# Patient Record
Sex: FEMALE | Race: WHITE | NOT HISPANIC OR LATINO
[De-identification: names, ages, dates, MRNs, and addresses within clinical notes are randomized per-mention and may not be internally consistent; named-entity substitution may affect disease eponyms.]

---

## 2023-01-09 ENCOUNTER — LABORATORY RESULT (OUTPATIENT)
Age: 33
End: 2023-01-09

## 2023-01-09 PROBLEM — Z00.00 ENCOUNTER FOR PREVENTIVE HEALTH EXAMINATION: Status: ACTIVE | Noted: 2023-01-09

## 2023-01-10 ENCOUNTER — APPOINTMENT (OUTPATIENT)
Dept: OBGYN | Facility: CLINIC | Age: 33
End: 2023-01-10
Payer: MEDICAID

## 2023-01-10 ENCOUNTER — LABORATORY RESULT (OUTPATIENT)
Age: 33
End: 2023-01-10

## 2023-01-10 VITALS
WEIGHT: 208 LBS | SYSTOLIC BLOOD PRESSURE: 138 MMHG | HEIGHT: 62 IN | BODY MASS INDEX: 38.28 KG/M2 | DIASTOLIC BLOOD PRESSURE: 69 MMHG

## 2023-01-10 DIAGNOSIS — Z34.91 ENCOUNTER FOR SUPERVISION OF NORMAL PREGNANCY, UNSPECIFIED, FIRST TRIMESTER: ICD-10-CM

## 2023-01-10 DIAGNOSIS — O46.90 ANTEPARTUM HEMORRHAGE, UNSPECIFIED, UNSPECIFIED TRIMESTER: ICD-10-CM

## 2023-01-10 DIAGNOSIS — F41.9 ANXIETY DISORDER, UNSPECIFIED: ICD-10-CM

## 2023-01-10 LAB
BILIRUB UR QL STRIP: NORMAL
GLUCOSE UR-MCNC: NORMAL
HCG UR QL: 0.2 EU/DL
HGB UR QL STRIP.AUTO: NORMAL
KETONES UR-MCNC: NORMAL
LEUKOCYTE ESTERASE UR QL STRIP: NORMAL
NITRITE UR QL STRIP: NORMAL
PH UR STRIP: 8.5
PROT UR STRIP-MCNC: NORMAL
SP GR UR STRIP: 1.02

## 2023-01-10 PROCEDURE — 76817 TRANSVAGINAL US OBSTETRIC: CPT

## 2023-01-10 PROCEDURE — 99385 PREV VISIT NEW AGE 18-39: CPT

## 2023-01-10 PROCEDURE — 81003 URINALYSIS AUTO W/O SCOPE: CPT | Mod: QW

## 2023-01-10 PROCEDURE — 99202 OFFICE O/P NEW SF 15 MIN: CPT | Mod: 25

## 2023-01-10 RX ORDER — ALPRAZOLAM 0.25 MG/1
0.25 TABLET ORAL 3 TIMES DAILY
Qty: 20 | Refills: 0 | Status: ACTIVE | COMMUNITY
Start: 2023-01-10 | End: 1900-01-01

## 2023-01-10 NOTE — HISTORY OF PRESENT ILLNESS
[FreeTextEntry1] : PT COMES IN FOR ANNUAL EXAM AND EVALUATION OF VAGINAL BLEEDING IN PREGNANCY \par HX OF IRREG CYCLES HX OF PCOS\par   X 4 , HAD GDM WITH 3 OUT 4 PREGNANCIES , LARGEST 8-4 \par INTERCOURSE 2 DAYS AGO, THIS AM NOTICED LIGHT SPOTTING WHEN USING A BATHROOM \par VERY ANXIOUS ABOUT IT \par ON SYNTHROID 200 MCG, FORGOT TO TAE LAST FEW DAYS \par \par URINE DIP NEG FOR UTI \par OBESE

## 2023-01-10 NOTE — PROCEDURE
[Cervical Pap Smear] : cervical Pap smear [Liquid Base] : liquid base [Tolerated Well] : the patient tolerated the procedure well [No Complications] : there were no complications [Transvaginal OB Sonogram] : Transvaginal OB Sonogram [Intrauterine Pregnancy] : intrauterine pregnancy [Yolk Sac] : yolk sac present [Fetal Heart] : fetal heart present [Date: ___] : Date: [unfilled] [Current GA by Sonogram: ___ (wks)] : Current GA by Sonogram: [unfilled]Uwks [___ day(s)] : [unfilled] days [Transvaginal OB Sonogram WNL] : Transvaginal OB Sonogram WNL [FreeTextEntry1] : POS FH, POS FM, BY FRANCIA LITTLE PRESENT, EDC 08/31/2023

## 2023-01-10 NOTE — PHYSICAL EXAM
[Chaperone Present] : A chaperone was present in the examining room during all aspects of the physical examination [FreeTextEntry1] : ARGELIA [Appropriately responsive] : appropriately responsive [Alert] : alert [No Acute Distress] : no acute distress [No Murmurs] : no murmurs [Soft] : soft [Non-tender] : non-tender [Non-distended] : non-distended [No HSM] : No HSM [No Lesions] : no lesions [No Mass] : no mass [Oriented x3] : oriented x3 [Examination Of The Breasts] : a normal appearance [No Masses] : no breast masses were palpable [Labia Majora] : normal [Labia Minora] : normal [Normal] : normal [Uterine Adnexae] : normal

## 2023-01-10 NOTE — PLAN
[FreeTextEntry1] : ANNUAL EXAM \par PAP GC CHL SENT FROM THE OFFICE\par NORMAL VIABLE PREGNANCY C/W DATES\par PT REASSURED\par  WILL SEND PN LABS, HGBA1C, TFT'S \par RX FOR XANAX SENT PRN\par DECLINES GENETICS\par  DECLINES FLU VACCINE \par N/V 4 WEEK

## 2023-01-11 ENCOUNTER — APPOINTMENT (OUTPATIENT)
Dept: OBGYN | Facility: CLINIC | Age: 33
End: 2023-01-11

## 2023-01-11 LAB
ABO + RH PNL BLD: NORMAL
BASOPHILS # BLD AUTO: 0.01 K/UL
BASOPHILS NFR BLD AUTO: 0.1 %
BLD GP AB SCN SERPL QL: NORMAL
EOSINOPHIL # BLD AUTO: 0.02 K/UL
EOSINOPHIL NFR BLD AUTO: 0.3 %
ESTIMATED AVERAGE GLUCOSE: 100 MG/DL
HBA1C MFR BLD HPLC: 5.1 %
HCT VFR BLD CALC: 40.9 %
HGB BLD-MCNC: 14 G/DL
HIV1+2 AB SPEC QL IA.RAPID: NONREACTIVE
IMM GRANULOCYTES NFR BLD AUTO: 0.4 %
LYMPHOCYTES # BLD AUTO: 1.62 K/UL
LYMPHOCYTES NFR BLD AUTO: 20.9 %
MAN DIFF?: NORMAL
MCHC RBC-ENTMCNC: 29.4 PG
MCHC RBC-ENTMCNC: 34.2 G/DL
MCV RBC AUTO: 85.9 FL
MONOCYTES # BLD AUTO: 0.43 K/UL
MONOCYTES NFR BLD AUTO: 5.5 %
NEUTROPHILS # BLD AUTO: 5.64 K/UL
NEUTROPHILS NFR BLD AUTO: 72.8 %
PLATELET # BLD AUTO: 292 K/UL
RBC # BLD: 4.76 M/UL
RBC # FLD: 13.7 %
TSH SERPL-ACNC: 0.01 UIU/ML
WBC # FLD AUTO: 7.75 K/UL

## 2023-01-18 LAB
B19V IGG SER QL IA: 7.04 INDEX
B19V IGG+IGM SER-IMP: NORMAL
B19V IGG+IGM SER-IMP: POSITIVE
B19V IGM FLD-ACNC: 0.12 INDEX
B19V IGM SER-ACNC: NEGATIVE
BACTERIA UR CULT: ABNORMAL
CYTOLOGY CVX/VAG DOC THIN PREP: NORMAL
HBV SURFACE AG SER QL: NONREACTIVE
LEAD BLD-MCNC: <1 UG/DL
MEV IGG FLD QL IA: 19.1 AU/ML
MEV IGG+IGM SER-IMP: POSITIVE
MUV AB SER-ACNC: POSITIVE
MUV IGG SER QL IA: 214 AU/ML
RUBV IGG FLD-ACNC: 1.5 INDEX
RUBV IGG SER-IMP: POSITIVE
T PALLIDUM AB SER QL IA: NEGATIVE
VZV AB TITR SER: POSITIVE
VZV IGG SER IF-ACNC: 1400 INDEX

## 2023-01-20 RX ORDER — PROGESTERONE 100 MG/1
100 CAPSULE ORAL
Qty: 60 | Refills: 0 | Status: ACTIVE | COMMUNITY
Start: 2023-01-20 | End: 1900-01-01

## 2023-01-23 ENCOUNTER — NON-APPOINTMENT (OUTPATIENT)
Age: 33
End: 2023-01-23

## 2023-01-23 ENCOUNTER — APPOINTMENT (OUTPATIENT)
Dept: OBGYN | Facility: CLINIC | Age: 33
End: 2023-01-23
Payer: MEDICAID

## 2023-01-23 VITALS
SYSTOLIC BLOOD PRESSURE: 111 MMHG | DIASTOLIC BLOOD PRESSURE: 75 MMHG | HEIGHT: 62 IN | WEIGHT: 210 LBS | BODY MASS INDEX: 38.64 KG/M2

## 2023-01-23 DIAGNOSIS — O99.810 ABNORMAL GLUCOSE COMPLICATING PREGNANCY: ICD-10-CM

## 2023-01-23 DIAGNOSIS — E03.9 HYPOTHYROIDISM, UNSPECIFIED: ICD-10-CM

## 2023-01-23 LAB
BILIRUB UR QL STRIP: NORMAL
GLUCOSE UR-MCNC: NORMAL
HCG UR QL: 0.2 EU/DL
HGB UR QL STRIP.AUTO: NORMAL
KETONES UR-MCNC: NORMAL
LEUKOCYTE ESTERASE UR QL STRIP: NORMAL
NITRITE UR QL STRIP: NORMAL
PH UR STRIP: 7
PROT UR STRIP-MCNC: 30
SP GR UR STRIP: 1.03

## 2023-01-23 PROCEDURE — 81003 URINALYSIS AUTO W/O SCOPE: CPT | Mod: QW

## 2023-01-23 PROCEDURE — 76817 TRANSVAGINAL US OBSTETRIC: CPT

## 2023-01-23 PROCEDURE — 0501F PRENATAL FLOW SHEET: CPT | Mod: NC

## 2023-01-23 RX ORDER — ISOPROPYL ALCOHOL 70 ML/100ML
SWAB TOPICAL
Qty: 200 | Refills: 5 | Status: ACTIVE | COMMUNITY
Start: 2023-01-23 | End: 1900-01-01

## 2023-01-23 RX ORDER — BLOOD SUGAR DIAGNOSTIC
STRIP MISCELLANEOUS 4 TIMES DAILY
Qty: 100 | Refills: 5 | Status: ACTIVE | COMMUNITY
Start: 2023-01-23 | End: 1900-01-01

## 2023-01-23 RX ORDER — LEVOTHYROXINE SODIUM 0.15 MG/1
150 TABLET ORAL DAILY
Qty: 30 | Refills: 2 | Status: ACTIVE | COMMUNITY
Start: 2023-01-23 | End: 1900-01-01

## 2023-01-23 RX ORDER — LANCETS 28 GAUGE
EACH MISCELLANEOUS
Qty: 100 | Refills: 5 | Status: ACTIVE | COMMUNITY
Start: 2023-01-23 | End: 1900-01-01

## 2023-01-23 RX ORDER — BLOOD-GLUCOSE METER
W/DEVICE KIT MISCELLANEOUS
Qty: 1 | Refills: 0 | Status: ACTIVE | COMMUNITY
Start: 2023-01-23 | End: 1900-01-01

## 2023-01-24 LAB
FOLATE SERPL-MCNC: >20 NG/ML
VIT B12 SERPL-MCNC: 627 PG/ML

## 2023-01-26 ENCOUNTER — NON-APPOINTMENT (OUTPATIENT)
Age: 33
End: 2023-01-26

## 2023-01-26 LAB
BACTERIA UR CULT: NORMAL
T4 FREE SERPL-MCNC: 2.1 NG/DL
TSH SERPL-ACNC: <0 UIU/ML

## 2023-02-09 ENCOUNTER — APPOINTMENT (OUTPATIENT)
Dept: OBGYN | Facility: CLINIC | Age: 33
End: 2023-02-09
Payer: MEDICAID

## 2023-02-09 VITALS
DIASTOLIC BLOOD PRESSURE: 83 MMHG | BODY MASS INDEX: 38.64 KG/M2 | SYSTOLIC BLOOD PRESSURE: 134 MMHG | HEIGHT: 62 IN | WEIGHT: 210 LBS

## 2023-02-09 LAB
BILIRUB UR QL STRIP: NORMAL
GLUCOSE UR-MCNC: NORMAL
HCG UR QL: 0.2 EU/DL
HGB UR QL STRIP.AUTO: NORMAL
KETONES UR-MCNC: NORMAL
LEUKOCYTE ESTERASE UR QL STRIP: NORMAL
NITRITE UR QL STRIP: NORMAL
PH UR STRIP: 8.5
PROT UR STRIP-MCNC: NORMAL
SP GR UR STRIP: 1.01

## 2023-02-09 PROCEDURE — 76817 TRANSVAGINAL US OBSTETRIC: CPT

## 2023-02-09 PROCEDURE — 81003 URINALYSIS AUTO W/O SCOPE: CPT | Mod: QW

## 2023-02-09 PROCEDURE — 0502F SUBSEQUENT PRENATAL CARE: CPT | Mod: NC

## 2023-03-08 ENCOUNTER — NON-APPOINTMENT (OUTPATIENT)
Age: 33
End: 2023-03-08

## 2023-03-10 ENCOUNTER — APPOINTMENT (OUTPATIENT)
Dept: OBGYN | Facility: CLINIC | Age: 33
End: 2023-03-10
Payer: MEDICAID

## 2023-03-10 VITALS — SYSTOLIC BLOOD PRESSURE: 119 MMHG | BODY MASS INDEX: 39.14 KG/M2 | DIASTOLIC BLOOD PRESSURE: 80 MMHG | WEIGHT: 214 LBS

## 2023-03-10 DIAGNOSIS — Z34.90 ENCOUNTER FOR SUPERVISION OF NORMAL PREGNANCY, UNSPECIFIED, UNSPECIFIED TRIMESTER: ICD-10-CM

## 2023-03-10 DIAGNOSIS — Z3A.01 LESS THAN 8 WEEKS GESTATION OF PREGNANCY: ICD-10-CM

## 2023-03-10 LAB
BILIRUB UR QL STRIP: NORMAL
GLUCOSE UR-MCNC: NORMAL
HCG UR QL: 0.2 EU/DL
HGB UR QL STRIP.AUTO: NORMAL
KETONES UR-MCNC: NORMAL
LEUKOCYTE ESTERASE UR QL STRIP: NORMAL
NITRITE UR QL STRIP: NORMAL
PH UR STRIP: 7
PROT UR STRIP-MCNC: NORMAL
SP GR UR STRIP: 1.02

## 2023-03-10 PROCEDURE — 81003 URINALYSIS AUTO W/O SCOPE: CPT | Mod: QW

## 2023-03-10 PROCEDURE — 0502F SUBSEQUENT PRENATAL CARE: CPT | Mod: NC

## 2023-03-22 ENCOUNTER — APPOINTMENT (OUTPATIENT)
Dept: MATERNAL FETAL MEDICINE | Facility: CLINIC | Age: 33
End: 2023-03-22

## 2023-03-22 ENCOUNTER — APPOINTMENT (OUTPATIENT)
Dept: MATERNAL FETAL MEDICINE | Facility: CLINIC | Age: 33
End: 2023-03-22
Payer: MEDICAID

## 2023-03-22 ENCOUNTER — ASOB RESULT (OUTPATIENT)
Age: 33
End: 2023-03-22

## 2023-03-22 PROCEDURE — G0108 DIAB MANAGE TRN  PER INDIV: CPT | Mod: 95

## 2023-04-04 ENCOUNTER — ASOB RESULT (OUTPATIENT)
Age: 33
End: 2023-04-04

## 2023-04-04 ENCOUNTER — APPOINTMENT (OUTPATIENT)
Dept: MATERNAL FETAL MEDICINE | Facility: CLINIC | Age: 33
End: 2023-04-04
Payer: MEDICAID

## 2023-04-04 PROCEDURE — G0108 DIAB MANAGE TRN  PER INDIV: CPT | Mod: 95

## 2023-04-16 ENCOUNTER — NON-APPOINTMENT (OUTPATIENT)
Age: 33
End: 2023-04-16

## 2023-04-17 ENCOUNTER — TRANSCRIPTION ENCOUNTER (OUTPATIENT)
Age: 33
End: 2023-04-17

## 2023-04-17 ENCOUNTER — APPOINTMENT (OUTPATIENT)
Dept: ANTEPARTUM | Facility: CLINIC | Age: 33
End: 2023-04-17
Payer: MEDICAID

## 2023-04-17 ENCOUNTER — ASOB RESULT (OUTPATIENT)
Age: 33
End: 2023-04-17

## 2023-04-17 PROCEDURE — 76811 OB US DETAILED SNGL FETUS: CPT

## 2023-04-19 ENCOUNTER — ASOB RESULT (OUTPATIENT)
Age: 33
End: 2023-04-19

## 2023-04-19 ENCOUNTER — APPOINTMENT (OUTPATIENT)
Dept: MATERNAL FETAL MEDICINE | Facility: CLINIC | Age: 33
End: 2023-04-19
Payer: MEDICAID

## 2023-04-19 PROCEDURE — G0108 DIAB MANAGE TRN  PER INDIV: CPT | Mod: 95

## 2023-05-01 ENCOUNTER — APPOINTMENT (OUTPATIENT)
Dept: OBGYN | Facility: CLINIC | Age: 33
End: 2023-05-01
Payer: MEDICAID

## 2023-05-01 VITALS — BODY MASS INDEX: 38.96 KG/M2 | WEIGHT: 213 LBS | DIASTOLIC BLOOD PRESSURE: 83 MMHG | SYSTOLIC BLOOD PRESSURE: 126 MMHG

## 2023-05-01 PROCEDURE — 0502F SUBSEQUENT PRENATAL CARE: CPT | Mod: NC

## 2023-05-01 PROCEDURE — 81003 URINALYSIS AUTO W/O SCOPE: CPT | Mod: QW

## 2023-05-10 ENCOUNTER — APPOINTMENT (OUTPATIENT)
Dept: MATERNAL FETAL MEDICINE | Facility: CLINIC | Age: 33
End: 2023-05-10
Payer: MEDICAID

## 2023-05-10 ENCOUNTER — ASOB RESULT (OUTPATIENT)
Age: 33
End: 2023-05-10

## 2023-05-10 PROCEDURE — G0108 DIAB MANAGE TRN  PER INDIV: CPT | Mod: 95

## 2023-05-16 ENCOUNTER — NON-APPOINTMENT (OUTPATIENT)
Age: 33
End: 2023-05-16

## 2023-06-01 ENCOUNTER — ASOB RESULT (OUTPATIENT)
Age: 33
End: 2023-06-01

## 2023-06-01 ENCOUNTER — APPOINTMENT (OUTPATIENT)
Dept: MATERNAL FETAL MEDICINE | Facility: CLINIC | Age: 33
End: 2023-06-01
Payer: MEDICAID

## 2023-06-01 PROCEDURE — G0108 DIAB MANAGE TRN  PER INDIV: CPT | Mod: 95

## 2023-06-01 RX ORDER — SYRINGE-NEEDLE,INSULIN,0.5 ML 31 GX5/16"
31G X 5/16" SYRINGE, EMPTY DISPOSABLE MISCELLANEOUS
Qty: 1 | Refills: 2 | Status: ACTIVE | COMMUNITY
Start: 2023-06-01 | End: 1900-01-01

## 2023-06-01 RX ORDER — INSULIN LISPRO 100 [IU]/ML
100 INJECTION, SOLUTION INTRAVENOUS; SUBCUTANEOUS
Qty: 1 | Refills: 2 | Status: ACTIVE | COMMUNITY
Start: 2023-06-01 | End: 1900-01-01

## 2023-06-02 ENCOUNTER — APPOINTMENT (OUTPATIENT)
Dept: OBGYN | Facility: CLINIC | Age: 33
End: 2023-06-02
Payer: MEDICAID

## 2023-06-02 VITALS — BODY MASS INDEX: 38.78 KG/M2 | SYSTOLIC BLOOD PRESSURE: 117 MMHG | DIASTOLIC BLOOD PRESSURE: 80 MMHG | WEIGHT: 212 LBS

## 2023-06-02 LAB
BILIRUB UR QL STRIP: NORMAL
GLUCOSE UR-MCNC: NORMAL
HCG UR QL: 2 EU/DL
HGB UR QL STRIP.AUTO: NORMAL
KETONES UR-MCNC: NORMAL
LEUKOCYTE ESTERASE UR QL STRIP: NORMAL
NITRITE UR QL STRIP: NORMAL
PH UR STRIP: 7.5
PROT UR STRIP-MCNC: 30
SP GR UR STRIP: 1.01

## 2023-06-02 PROCEDURE — 76816 OB US FOLLOW-UP PER FETUS: CPT

## 2023-06-02 PROCEDURE — 0502F SUBSEQUENT PRENATAL CARE: CPT

## 2023-06-10 ENCOUNTER — NON-APPOINTMENT (OUTPATIENT)
Age: 33
End: 2023-06-10

## 2023-06-22 ENCOUNTER — ASOB RESULT (OUTPATIENT)
Age: 33
End: 2023-06-22

## 2023-06-22 ENCOUNTER — APPOINTMENT (OUTPATIENT)
Dept: MATERNAL FETAL MEDICINE | Facility: CLINIC | Age: 33
End: 2023-06-22
Payer: MEDICAID

## 2023-06-22 PROCEDURE — G0108 DIAB MANAGE TRN  PER INDIV: CPT | Mod: 95

## 2023-06-27 ENCOUNTER — APPOINTMENT (OUTPATIENT)
Dept: OBGYN | Facility: CLINIC | Age: 33
End: 2023-06-27
Payer: MEDICAID

## 2023-06-27 VITALS — BODY MASS INDEX: 38.96 KG/M2 | SYSTOLIC BLOOD PRESSURE: 116 MMHG | DIASTOLIC BLOOD PRESSURE: 75 MMHG | WEIGHT: 213 LBS

## 2023-06-27 LAB
BILIRUB UR QL STRIP: NORMAL
GLUCOSE UR-MCNC: NORMAL
HCG UR QL: 0.2 EU/DL
HGB UR QL STRIP.AUTO: NORMAL
KETONES UR-MCNC: NORMAL
NITRITE UR QL STRIP: NORMAL
PH UR STRIP: 8.5
PROT UR STRIP-MCNC: NORMAL
SP GR UR STRIP: 1.01

## 2023-06-27 PROCEDURE — 0502F SUBSEQUENT PRENATAL CARE: CPT

## 2023-06-27 PROCEDURE — 76819 FETAL BIOPHYS PROFIL W/O NST: CPT

## 2023-07-13 ENCOUNTER — APPOINTMENT (OUTPATIENT)
Dept: MATERNAL FETAL MEDICINE | Facility: CLINIC | Age: 33
End: 2023-07-13
Payer: COMMERCIAL

## 2023-07-13 ENCOUNTER — ASOB RESULT (OUTPATIENT)
Age: 33
End: 2023-07-13

## 2023-07-13 PROCEDURE — G0108 DIAB MANAGE TRN  PER INDIV: CPT | Mod: 95

## 2023-07-21 ENCOUNTER — APPOINTMENT (OUTPATIENT)
Dept: OBGYN | Facility: CLINIC | Age: 33
End: 2023-07-21
Payer: COMMERCIAL

## 2023-07-21 VITALS — SYSTOLIC BLOOD PRESSURE: 124 MMHG | BODY MASS INDEX: 39.69 KG/M2 | WEIGHT: 217 LBS | DIASTOLIC BLOOD PRESSURE: 80 MMHG

## 2023-07-21 LAB
BILIRUB UR QL STRIP: NORMAL
GLUCOSE UR-MCNC: NORMAL
HCG UR QL: 0.2 EU/DL
HGB UR QL STRIP.AUTO: NORMAL
KETONES UR-MCNC: NORMAL
LEUKOCYTE ESTERASE UR QL STRIP: NORMAL
NITRITE UR QL STRIP: NORMAL
PH UR STRIP: 6
PROT UR STRIP-MCNC: 30
SP GR UR STRIP: 1.02

## 2023-07-21 PROCEDURE — 59025 FETAL NON-STRESS TEST: CPT

## 2023-07-21 PROCEDURE — 81003 URINALYSIS AUTO W/O SCOPE: CPT | Mod: QW

## 2023-07-21 PROCEDURE — 99213 OFFICE O/P EST LOW 20 MIN: CPT | Mod: 25

## 2023-07-21 PROCEDURE — 76816 OB US FOLLOW-UP PER FETUS: CPT

## 2023-07-21 RX ORDER — INSULIN HUMAN 100 [IU]/ML
100 INJECTION, SUSPENSION SUBCUTANEOUS AT BEDTIME
Qty: 1 | Refills: 1 | Status: ACTIVE | COMMUNITY
Start: 2023-07-21 | End: 1900-01-01

## 2023-07-28 ENCOUNTER — APPOINTMENT (OUTPATIENT)
Dept: OBGYN | Facility: CLINIC | Age: 33
End: 2023-07-28
Payer: COMMERCIAL

## 2023-07-28 VITALS — SYSTOLIC BLOOD PRESSURE: 109 MMHG | DIASTOLIC BLOOD PRESSURE: 74 MMHG | BODY MASS INDEX: 39.51 KG/M2 | WEIGHT: 216 LBS

## 2023-07-28 LAB
BILIRUB UR QL STRIP: NORMAL
GLUCOSE UR-MCNC: NORMAL
HCG UR QL: 1 EU/DL
HGB UR QL STRIP.AUTO: NORMAL
KETONES UR-MCNC: NORMAL
LEUKOCYTE ESTERASE UR QL STRIP: NORMAL
NITRITE UR QL STRIP: NORMAL
PH UR STRIP: 6.5
PROT UR STRIP-MCNC: 30
SP GR UR STRIP: 1.02

## 2023-07-28 PROCEDURE — 81003 URINALYSIS AUTO W/O SCOPE: CPT | Mod: QW

## 2023-07-28 PROCEDURE — 76818 FETAL BIOPHYS PROFILE W/NST: CPT

## 2023-07-28 PROCEDURE — 99213 OFFICE O/P EST LOW 20 MIN: CPT | Mod: 25

## 2023-07-31 ENCOUNTER — APPOINTMENT (OUTPATIENT)
Dept: ANTEPARTUM | Facility: CLINIC | Age: 33
End: 2023-07-31
Payer: COMMERCIAL

## 2023-07-31 ENCOUNTER — ASOB RESULT (OUTPATIENT)
Age: 33
End: 2023-07-31

## 2023-07-31 VITALS
HEART RATE: 89 BPM | WEIGHT: 219 LBS | DIASTOLIC BLOOD PRESSURE: 80 MMHG | BODY MASS INDEX: 40.3 KG/M2 | HEIGHT: 62 IN | SYSTOLIC BLOOD PRESSURE: 112 MMHG

## 2023-07-31 PROCEDURE — 76816 OB US FOLLOW-UP PER FETUS: CPT

## 2023-07-31 PROCEDURE — 76819 FETAL BIOPHYS PROFIL W/O NST: CPT | Mod: 59

## 2023-08-01 ENCOUNTER — ASOB RESULT (OUTPATIENT)
Age: 33
End: 2023-08-01

## 2023-08-01 ENCOUNTER — APPOINTMENT (OUTPATIENT)
Dept: MATERNAL FETAL MEDICINE | Facility: CLINIC | Age: 33
End: 2023-08-01
Payer: COMMERCIAL

## 2023-08-01 LAB
ESTIMATED AVERAGE GLUCOSE: 108 MG/DL
HBA1C MFR BLD HPLC: 5.4 %
HCV AB SER QL: NONREACTIVE
HCV S/CO RATIO: 0.07 S/CO
HIV1+2 AB SPEC QL IA.RAPID: NONREACTIVE
RPR SER-TITR: NORMAL

## 2023-08-01 PROCEDURE — G0108 DIAB MANAGE TRN  PER INDIV: CPT | Mod: 95

## 2023-08-04 ENCOUNTER — APPOINTMENT (OUTPATIENT)
Dept: OBGYN | Facility: CLINIC | Age: 33
End: 2023-08-04
Payer: COMMERCIAL

## 2023-08-04 VITALS — SYSTOLIC BLOOD PRESSURE: 122 MMHG | WEIGHT: 218 LBS | BODY MASS INDEX: 39.87 KG/M2 | DIASTOLIC BLOOD PRESSURE: 81 MMHG

## 2023-08-04 PROCEDURE — 76818 FETAL BIOPHYS PROFILE W/NST: CPT

## 2023-08-04 PROCEDURE — 81003 URINALYSIS AUTO W/O SCOPE: CPT | Mod: QW

## 2023-08-04 PROCEDURE — 99213 OFFICE O/P EST LOW 20 MIN: CPT | Mod: 25

## 2023-08-07 ENCOUNTER — APPOINTMENT (OUTPATIENT)
Dept: OBGYN | Facility: CLINIC | Age: 33
End: 2023-08-07
Payer: COMMERCIAL

## 2023-08-07 ENCOUNTER — NON-APPOINTMENT (OUTPATIENT)
Age: 33
End: 2023-08-07

## 2023-08-07 ENCOUNTER — INPATIENT (INPATIENT)
Facility: HOSPITAL | Age: 33
LOS: 2 days | Discharge: HOME | DRG: 540 | End: 2023-08-10
Attending: SPECIALIST | Admitting: SPECIALIST
Payer: COMMERCIAL

## 2023-08-07 VITALS — DIASTOLIC BLOOD PRESSURE: 72 MMHG | WEIGHT: 218 LBS | SYSTOLIC BLOOD PRESSURE: 109 MMHG | BODY MASS INDEX: 39.87 KG/M2

## 2023-08-07 VITALS
HEIGHT: 62 IN | RESPIRATION RATE: 14 BRPM | WEIGHT: 218.04 LBS | HEART RATE: 100 BPM | SYSTOLIC BLOOD PRESSURE: 130 MMHG | TEMPERATURE: 98 F | DIASTOLIC BLOOD PRESSURE: 78 MMHG

## 2023-08-07 DIAGNOSIS — O26.893 OTHER SPECIFIED PREGNANCY RELATED CONDITIONS, THIRD TRIMESTER: ICD-10-CM

## 2023-08-07 DIAGNOSIS — Z98.84 BARIATRIC SURGERY STATUS: Chronic | ICD-10-CM

## 2023-08-07 DIAGNOSIS — E11.9 TYPE 2 DIABETES MELLITUS W/OUT COMPLICATIONS: ICD-10-CM

## 2023-08-07 DIAGNOSIS — Z34.90 ENCOUNTER FOR SUPERVISION OF NORMAL PREGNANCY, UNSPECIFIED, UNSPECIFIED TRIMESTER: ICD-10-CM

## 2023-08-07 LAB
APPEARANCE UR: ABNORMAL
BASOPHILS # BLD AUTO: 0.02 K/UL — SIGNIFICANT CHANGE UP (ref 0–0.2)
BASOPHILS NFR BLD AUTO: 0.2 % — SIGNIFICANT CHANGE UP (ref 0–1)
BILIRUB UR QL STRIP: NORMAL
BILIRUB UR-MCNC: NEGATIVE — SIGNIFICANT CHANGE UP
COLOR SPEC: YELLOW — SIGNIFICANT CHANGE UP
DIFF PNL FLD: NEGATIVE — SIGNIFICANT CHANGE UP
EOSINOPHIL # BLD AUTO: 0.03 K/UL — SIGNIFICANT CHANGE UP (ref 0–0.7)
EOSINOPHIL NFR BLD AUTO: 0.3 % — SIGNIFICANT CHANGE UP (ref 0–8)
GLUCOSE BLDC GLUCOMTR-MCNC: 85 MG/DL — SIGNIFICANT CHANGE UP (ref 70–99)
GLUCOSE UR QL: NEGATIVE MG/DL — SIGNIFICANT CHANGE UP
GLUCOSE UR-MCNC: NORMAL
HCG UR QL: 0.2 EU/DL
HCT VFR BLD CALC: 40 % — SIGNIFICANT CHANGE UP (ref 37–47)
HGB BLD-MCNC: 14 G/DL — SIGNIFICANT CHANGE UP (ref 12–16)
HGB UR QL STRIP.AUTO: NORMAL
IMM GRANULOCYTES NFR BLD AUTO: 0.5 % — HIGH (ref 0.1–0.3)
KETONES UR-MCNC: NEGATIVE MG/DL — SIGNIFICANT CHANGE UP
KETONES UR-MCNC: NORMAL
LEUKOCYTE ESTERASE UR QL STRIP: NORMAL
LEUKOCYTE ESTERASE UR-ACNC: ABNORMAL
LYMPHOCYTES # BLD AUTO: 1.95 K/UL — SIGNIFICANT CHANGE UP (ref 1.2–3.4)
LYMPHOCYTES # BLD AUTO: 20 % — LOW (ref 20.5–51.1)
MCHC RBC-ENTMCNC: 30.2 PG — SIGNIFICANT CHANGE UP (ref 27–31)
MCHC RBC-ENTMCNC: 35 G/DL — SIGNIFICANT CHANGE UP (ref 32–37)
MCV RBC AUTO: 86.2 FL — SIGNIFICANT CHANGE UP (ref 81–99)
MONOCYTES # BLD AUTO: 0.64 K/UL — HIGH (ref 0.1–0.6)
MONOCYTES NFR BLD AUTO: 6.6 % — SIGNIFICANT CHANGE UP (ref 1.7–9.3)
NEUTROPHILS # BLD AUTO: 7.06 K/UL — HIGH (ref 1.4–6.5)
NEUTROPHILS NFR BLD AUTO: 72.4 % — SIGNIFICANT CHANGE UP (ref 42.2–75.2)
NITRITE UR QL STRIP: NORMAL
NITRITE UR-MCNC: NEGATIVE — SIGNIFICANT CHANGE UP
NRBC # BLD: 0 /100 WBCS — SIGNIFICANT CHANGE UP (ref 0–0)
PH UR STRIP: 5.5
PH UR: 6.5 — SIGNIFICANT CHANGE UP (ref 5–8)
PLATELET # BLD AUTO: 191 K/UL — SIGNIFICANT CHANGE UP (ref 130–400)
PMV BLD: 10.6 FL — HIGH (ref 7.4–10.4)
PRENATAL SYPHILIS TEST: SIGNIFICANT CHANGE UP
PROT UR STRIP-MCNC: NORMAL
PROT UR-MCNC: SIGNIFICANT CHANGE UP MG/DL
RBC # BLD: 4.64 M/UL — SIGNIFICANT CHANGE UP (ref 4.2–5.4)
RBC # FLD: 13.3 % — SIGNIFICANT CHANGE UP (ref 11.5–14.5)
SP GR SPEC: 1.01 — SIGNIFICANT CHANGE UP (ref 1–1.03)
SP GR UR STRIP: 1.02
UROBILINOGEN FLD QL: 0.2 MG/DL — SIGNIFICANT CHANGE UP (ref 0.2–1)
WBC # BLD: 9.75 K/UL — SIGNIFICANT CHANGE UP (ref 4.8–10.8)
WBC # FLD AUTO: 9.75 K/UL — SIGNIFICANT CHANGE UP (ref 4.8–10.8)

## 2023-08-07 PROCEDURE — 86900 BLOOD TYPING SEROLOGIC ABO: CPT

## 2023-08-07 PROCEDURE — 82962 GLUCOSE BLOOD TEST: CPT

## 2023-08-07 PROCEDURE — 80307 DRUG TEST PRSMV CHEM ANLYZR: CPT

## 2023-08-07 PROCEDURE — 59025 FETAL NON-STRESS TEST: CPT | Mod: 59

## 2023-08-07 PROCEDURE — 85025 COMPLETE CBC W/AUTO DIFF WBC: CPT

## 2023-08-07 PROCEDURE — 76815 OB US LIMITED FETUS(S): CPT

## 2023-08-07 PROCEDURE — 81003 URINALYSIS AUTO W/O SCOPE: CPT | Mod: QW

## 2023-08-07 PROCEDURE — 80354 DRUG SCREENING FENTANYL: CPT

## 2023-08-07 PROCEDURE — 81001 URINALYSIS AUTO W/SCOPE: CPT

## 2023-08-07 PROCEDURE — 86850 RBC ANTIBODY SCREEN: CPT

## 2023-08-07 PROCEDURE — 59025 FETAL NON-STRESS TEST: CPT

## 2023-08-07 PROCEDURE — 86901 BLOOD TYPING SEROLOGIC RH(D): CPT

## 2023-08-07 PROCEDURE — 36415 COLL VENOUS BLD VENIPUNCTURE: CPT

## 2023-08-07 PROCEDURE — 86592 SYPHILIS TEST NON-TREP QUAL: CPT

## 2023-08-07 PROCEDURE — 99213 OFFICE O/P EST LOW 20 MIN: CPT | Mod: 25

## 2023-08-07 RX ORDER — OXYTOCIN 10 UNIT/ML
333.33 VIAL (ML) INJECTION
Qty: 20 | Refills: 0 | Status: DISCONTINUED | OUTPATIENT
Start: 2023-08-07 | End: 2023-08-08

## 2023-08-07 RX ORDER — NALOXONE HYDROCHLORIDE 4 MG/.1ML
0.1 SPRAY NASAL
Refills: 0 | Status: DISCONTINUED | OUTPATIENT
Start: 2023-08-07 | End: 2023-08-08

## 2023-08-07 RX ORDER — ONDANSETRON 8 MG/1
4 TABLET, FILM COATED ORAL EVERY 6 HOURS
Refills: 0 | Status: DISCONTINUED | OUTPATIENT
Start: 2023-08-07 | End: 2023-08-08

## 2023-08-07 RX ORDER — DEXAMETHASONE 0.5 MG/5ML
4 ELIXIR ORAL EVERY 6 HOURS
Refills: 0 | Status: DISCONTINUED | OUTPATIENT
Start: 2023-08-07 | End: 2023-08-08

## 2023-08-07 RX ORDER — FENTANYL/BUPIVACAINE/NS/PF 2MCG/ML-.1
250 PLASTIC BAG, INJECTION (ML) INJECTION
Refills: 0 | Status: DISCONTINUED | OUTPATIENT
Start: 2023-08-07 | End: 2023-08-08

## 2023-08-07 RX ORDER — SODIUM CHLORIDE 9 MG/ML
1000 INJECTION, SOLUTION INTRAVENOUS
Refills: 0 | Status: DISCONTINUED | OUTPATIENT
Start: 2023-08-07 | End: 2023-08-08

## 2023-08-07 RX ORDER — LEVOTHYROXINE SODIUM 125 MCG
150 TABLET ORAL DAILY
Refills: 0 | Status: DISCONTINUED | OUTPATIENT
Start: 2023-08-07 | End: 2023-08-08

## 2023-08-07 NOTE — OB PROVIDER H&P - NS_OBGYNHISTORY_OBGYN_ALL_OB_FT
OB Hx:  P1) 7/10/2011, female, FT   P2) 2013, female, largest 3515grams  P3) 3/28/2018, male  P4) 2021, female    1 SAB, no d/c  Gyn Hx: no hx of fibroids, cysts, STDs or abnormal pap smears

## 2023-08-07 NOTE — OB PROVIDER H&P - NSHPPHYSICALEXAM_GEN_ALL_CORE
Physical Exam  Vital Signs Last 24 Hrs  T(F): 98.2 (07 Aug 2023 20:10), Max: 98.2 (07 Aug 2023 20:10)  HR: 92 (07 Aug 2023 21:09) (92 - 100)  BP: 117/69 (07 Aug 2023 21:09) (111/68 - 130/78)  RR: 14 (07 Aug 2023 20:10) (14 - 14)    Gen: NAD, AOx3  Abdomen: soft, gravid, nontender, no palpable contractions    EFM: 145 bpm/ moderate variability/ +accels  [place]: not frantz  SVE:     BSS: vertex Physical Exam  Vital Signs Last 24 Hrs  T(F): 98.2 (07 Aug 2023 20:10), Max: 98.2 (07 Aug 2023 20:10)  HR: 92 (07 Aug 2023 21:09) (92 - 100)  BP: 117/69 (07 Aug 2023 21:09) (111/68 - 130/78)  RR: 14 (07 Aug 2023 20:10) (14 - 14)    Gen: NAD, AOx3  Abdomen: soft, gravid, nontender, no palpable contractions    EFM: 145 bpm/ moderate variability/ +accels  [place]: not frantz  SVE: 1/0/-3, vertex by sono, intact    BSS: vertex

## 2023-08-07 NOTE — PROCEDURE NOTE - ADDITIONAL PROCEDURE DETAILS
Thorough discussion of patient's history, as indicated above.  Discussed risks of epidural, including PDPH, inadequate analgesia occasionally requiring epidural catheter replacement, bleeding, infection and spinal cord injury.  Patient expressed understanding of these risks, signed informed consent and wishes to proceed with epidural catheter insertion.  Lumbar epidural performed at L3-4. Standard ASA monitors including BP, pulse oximetry, FHR. Sterile gloves, mask, chlorhexidine prep. 1% lidocaine for local infiltration. 17g Touhy. RAFY to saline at 6cm.  Epidural catheter threaded easily to 11cm. Touhy needle removed. Catheter secured in place. Aspiration negative for blood/CSF. Test dose consisting of 3ml 1.5% lidocaine with epinephrine was negative. 7mL 0.0625% bupi with fentanyl given via epidural infusion pump after negative aspiration. Patient tolerated procedure, was hemodynamically stable throughout and did not complain of pain or paresthesias. T10 level bilaterally. Epidural infusion consisting of 250ml 0.0625% bupivacaine with fentanyl 2mcg/ml infusing at 10 mL/hour with patient controlled bolus of 5cc q 15 minutes as needed for pain.    Several minutes after loading dose, patient felt dizziness and nausea, SBP 90s, repositioned to [facility], IV fluids given, phenylephrine 100mcg, ephedrine 5mg given iv; complete resolution of symptoms <60 seconds with sbp>120. Thorough discussion of patient's history, as indicated above.  Discussed risks of epidural, including PDPH, inadequate analgesia occasionally requiring epidural catheter replacement, bleeding, infection and spinal cord injury.  Patient expressed understanding of these risks, signed informed consent and wishes to proceed with epidural catheter insertion.  Lumbar epidural performed at L3-4. Standard ASA monitors including BP, pulse oximetry, FHR. Sterile gloves, mask, chlorhexidine prep. 1% lidocaine for local infiltration. 17g Touhy. RAFY to saline at 6cm.  Epidural catheter threaded easily to 11cm. Touhy needle removed. Catheter secured in place. Aspiration negative for blood/CSF. Test dose consisting of 3ml 1.5% lidocaine with epinephrine was negative. 7mL 0.0625% bupi with fentanyl given via epidural infusion pump after negative aspiration. Patient tolerated procedure, was hemodynamically stable throughout and did not complain of pain or paresthesias. T10 level bilaterally. Epidural infusion consisting of 250ml 0.0625% bupivacaine with fentanyl 2mcg/ml infusing at 10 mL/hour with patient controlled bolus of 5cc q 15 minutes as needed for pain.    Several minutes after loading dose, patient felt dizziness and nausea, SBP 90s, repositioned to [facility], IV fluids given, phenylephrine 100mcg, ephedrine 5mg given iv; complete resolution of symptoms <60 seconds with sbp>120.    1615 Patient seen at bedside. Mild pain with contractions. Plan CD for breech. 5cc 2% Lidocaine administred. 1630 T10 3cc 2% lidocaine. Pain well controlled.  VSS 1642 Awaiting OR. Thorough discussion of patient's history, as indicated above.  Discussed risks of epidural, including PDPH, inadequate analgesia occasionally requiring epidural catheter replacement, bleeding, infection and spinal cord injury.  Patient expressed understanding of these risks, signed informed consent and wishes to proceed with epidural catheter insertion.  Lumbar epidural performed at L3-4. Standard ASA monitors including BP, pulse oximetry, FHR. Sterile gloves, mask, chlorhexidine prep. 1% lidocaine for local infiltration. 17g Touhy. RAFY to saline at 6cm.  Epidural catheter threaded easily to 11cm. Touhy needle removed. Catheter secured in place. Aspiration negative for blood/CSF. Test dose consisting of 3ml 1.5% lidocaine with epinephrine was negative. 7mL 0.0625% bupi with fentanyl given via epidural infusion pump after negative aspiration. Patient tolerated procedure, was hemodynamically stable throughout and did not complain of pain or paresthesias. T10 level bilaterally. Epidural infusion consisting of 250ml 0.0625% bupivacaine with fentanyl 2mcg/ml infusing at 10 mL/hour with patient controlled bolus of 5cc q 15 minutes as needed for pain.    Several minutes after loading dose, patient felt dizziness and nausea, SBP 90s, repositioned to [facility], IV fluids given, phenylephrine 100mcg, ephedrine 5mg given iv; complete resolution of symptoms <60 seconds with sbp>120.    1615 Patient seen at bedside. Mild pain with contractions. Plan CD for breech. 5cc 2% Lidocaine administred. 1630 T10 3cc 2% lidocaine. Pain well controlled.  VSS 1642 Awaiting OR.    In OR Epidural one sided  CSE Performed     Patient sitting. Lumbar epidural performed at L3-4. Standard ASA monitors including FHR. Sterile gloves, Chloro-prep. 1% lidocaine for local infiltration. 17g touhy. RAFY with air. 27g jody used to make a dural puncture with + CSF. .75% Bupivacaine 1cc administered through spinal needle. Jody needle removed and epidural catheter threaded easily. Touhy needle removed. Catheter secured in place at 13  cm. Negative aspiration. Test dose consisting of 3ml 1.5% lidocaine with epinephrine was negative.  Patient tolerated procedure and was hemodynamically stable throughout. T4 level bilaterally.

## 2023-08-07 NOTE — OB PROVIDER H&P - NSHPLABSRESULTS_GEN_ALL_CORE
8/6  GBS negative  7/28  HBA1C 5.4  RPR NR  HepC NR  HIV NR  1/10  HepBSag NR  MMR immune  antibody screen negative  blood type O positive  GC negative    [place]:   35w6d: AC 37%, EFW 2555g, BPP 8/8, MVP 4.32, posterior placenta  20w6d: BRANDIE wnl, EFW 395g, anatomy nl, cervix 4.03cm, ovaries b/l nl

## 2023-08-07 NOTE — PROGRESS NOTE ADULT - SUBJECTIVE AND OBJECTIVE BOX
PGY1 Note    Patient seen at bedside for labor progression. No complaints at the moment.    T(F): 98.2 ( @ 20:10), Max: 98.2 ( @ 20:10)  HR: 85 ( @ 23:05)  BP: 106/53 ( @ 23:05) (92/47 - 130/78)  RR: 14 ( @ 21:34)  EFM: 135bpm/ moderate variability +accels   TOCO: not frantz   SVE: 1/0/-3    Medications:      Labs:                        14.0   9.75  )-----------( 191      ( 07 Aug 2023 20:25 )             40.0           Prenatal Syphilis Test: Nonreact ( @ 20:25)  Antibody Screen: NEG (23 @ 20:25)    Urinalysis Basic - ( 07 Aug 2023 20:25 )    Color: Yellow / Appearance: Cloudy / S.010 / pH: x  Gluc: x / Ketone: Negative mg/dL  / Bili: Negative / Urobili: 0.2 mg/dL   Blood: x / Protein: Trace mg/dL / Nitrite: Negative   Leuk Esterase: Large / RBC: 0 /HPF /  /HPF   Sq Epi: x / Non Sq Epi: 12 /HPF / Bacteria: Moderate /HPF

## 2023-08-07 NOTE — OB RN PATIENT PROFILE - NS_GBS_INFANT_INVASIVE_OBGYN_ALL_OB_FT
Problem: Nutritional:  Goal: Achieve adequate nutritional intake  Description: Patient will consume 50% of meals/supplements  Outcome: PROGRESSING AS EXPECTED     See RD note   Patient states no history

## 2023-08-07 NOTE — OB RN PATIENT PROFILE - FALL HARM RISK - UNIVERSAL INTERVENTIONS
Bed in lowest position, wheels locked, appropriate side rails in place/Call bell, personal items and telephone in reach/Instruct patient to call for assistance before getting out of bed or chair/Non-slip footwear when patient is out of bed/[place] to call system/Physically safe environment - no spills, clutter or unnecessary equipment/Purposeful Proactive Rounding/Room/bathroom lighting operational, light cord in reach

## 2023-08-07 NOTE — OB PROVIDER H&P - HISTORY OF PRESENT ILLNESS
35 yo  at 36w6d, AUDELIA 2023 dated by LMP c/w 1st trimester US, presents to L&D for scheduled IOL for gestational diabetes. Denies LOF, VB, ctx and endorses good fetal movement. Pregnancy complicated by gestational diabetes, currently managed with insulin. Hx of hypothyroidism on synthroid. GBS negative.  33 yo  at 36w6d, AUDELIA 2023 dated by LMP c/w 1st trimester US, presents to L&D for scheduled IOL for poorly controlled gestational diabetes. Current insulin regimen, NPH 8u qhs, Lispro . Denies LOF, VB, ctx and endorses good fetal movement. Hx of hypothyroidism on synthroid 150mcg in pregnancy (250mcg pre-pregnancy). GBS negative.

## 2023-08-07 NOTE — OB PROVIDER H&P - ASSESSMENT
33y  @ 36w6d, GBS negative, IOL for GDMA2     -Admit to L & D  -FS, last was 85, D5 given  -IV hydration, labs  -Continuous EFM & TOCO monitoring  -Clear Liquid diet  -Pain Management PRN  IOL w/ balloon    Dr. Lopez and Dr. Bailey aware.  33y  @ 36w6d, GBS negative, IOL for poorly controlled GDMA2, recommended by MFM     -Admit to L & D  -FS, last was 85, D5 given  -IV hydration, labs  -Continuous EFM & TOCO monitoring  -Clear Liquid diet  -Pain Management PRN  IOL w/ balloon    Dr. Lopez and Dr. Bailey aware.

## 2023-08-07 NOTE — OB PROVIDER H&P - NSLOWPPHRISK_OBGYN_A_OB
No previous uterine incision/[place] Pregnancy/Less than or equal to 4 previous vaginal births/No known bleeding disorder/No history of postpartum hemorrhage/No other PPH risks indicated

## 2023-08-07 NOTE — PROGRESS NOTE ADULT - ASSESSMENT
A/P:   35 yo  at 36w6d, AUDELIA 2023 dated by LMP c/w 1st trimester US, GBS negative, s/p epidural, s/p cervical ripening balloon, in labor progressing well.  -Continue current management   -Pain management prn  -Continuous EFM/toco  -IV fluid hydration, CLD  -F/u FS  -Reevaluate     Dr. Lopez and Dr. Bailey to be made aware.  A/P:   33 yo  at 36w6d, AUDELIA 2023 dated by LMP c/w 1st trimester US, GBS negative, s/p epidural, IOL for worsening GDM, recommended by MFM  -Continue current management   -Pain management prn  -Continuous EFM/toco  -IV fluid hydration, CLD  -FS q4hrs in latent labor, q2hrs in active phase  - CRB for IOL    Dr. Lopez and Dr. Bailey to be made aware.

## 2023-08-08 ENCOUNTER — TRANSCRIPTION ENCOUNTER (OUTPATIENT)
Age: 33
End: 2023-08-08

## 2023-08-08 ENCOUNTER — NON-APPOINTMENT (OUTPATIENT)
Age: 33
End: 2023-08-08

## 2023-08-08 LAB
B-HEM STREP SPEC QL CULT: NORMAL
GLUCOSE BLDC GLUCOMTR-MCNC: 100 MG/DL — HIGH (ref 70–99)
GLUCOSE BLDC GLUCOMTR-MCNC: 70 MG/DL — SIGNIFICANT CHANGE UP (ref 70–99)
GLUCOSE BLDC GLUCOMTR-MCNC: 76 MG/DL — SIGNIFICANT CHANGE UP (ref 70–99)
GLUCOSE BLDC GLUCOMTR-MCNC: 85 MG/DL — SIGNIFICANT CHANGE UP (ref 70–99)
L&D DRUG SCREEN, URINE: SIGNIFICANT CHANGE UP

## 2023-08-08 PROCEDURE — 59514 CESAREAN DELIVERY ONLY: CPT | Mod: U7

## 2023-08-08 RX ORDER — IBUPROFEN 200 MG
600 TABLET ORAL EVERY 6 HOURS
Refills: 0 | Status: COMPLETED | OUTPATIENT
Start: 2023-08-08 | End: 2024-07-06

## 2023-08-08 RX ORDER — TETANUS TOXOID, REDUCED DIPHTHERIA TOXOID AND ACELLULAR PERTUSSIS VACCINE, ADSORBED 5; 2.5; 8; 8; 2.5 [IU]/.5ML; [IU]/.5ML; UG/.5ML; UG/.5ML; UG/.5ML
0.5 SUSPENSION INTRAMUSCULAR ONCE
Refills: 0 | Status: DISCONTINUED | OUTPATIENT
Start: 2023-08-08 | End: 2023-08-10

## 2023-08-08 RX ORDER — MAGNESIUM HYDROXIDE 400 MG/1
30 TABLET, CHEWABLE ORAL
Refills: 0 | Status: DISCONTINUED | OUTPATIENT
Start: 2023-08-08 | End: 2023-08-10

## 2023-08-08 RX ORDER — OXYCODONE HYDROCHLORIDE 5 MG/1
5 TABLET ORAL
Refills: 0 | Status: DISCONTINUED | OUTPATIENT
Start: 2023-08-08 | End: 2023-08-10

## 2023-08-08 RX ORDER — OXYTOCIN 10 UNIT/ML
1 VIAL (ML) INJECTION
Qty: 30 | Refills: 0 | Status: DISCONTINUED | OUTPATIENT
Start: 2023-08-08 | End: 2023-08-08

## 2023-08-08 RX ORDER — ACETAMINOPHEN 500 MG
975 TABLET ORAL
Refills: 0 | Status: DISCONTINUED | OUTPATIENT
Start: 2023-08-08 | End: 2023-08-10

## 2023-08-08 RX ORDER — AZITHROMYCIN 500 MG/1
500 TABLET, FILM COATED ORAL ONCE
Refills: 0 | Status: COMPLETED | OUTPATIENT
Start: 2023-08-08 | End: 2023-08-08

## 2023-08-08 RX ORDER — SIMETHICONE 80 MG/1
80 TABLET, CHEWABLE ORAL EVERY 4 HOURS
Refills: 0 | Status: DISCONTINUED | OUTPATIENT
Start: 2023-08-08 | End: 2023-08-10

## 2023-08-08 RX ORDER — OXYCODONE HYDROCHLORIDE 5 MG/1
5 TABLET ORAL ONCE
Refills: 0 | Status: DISCONTINUED | OUTPATIENT
Start: 2023-08-08 | End: 2023-08-10

## 2023-08-08 RX ORDER — CEFAZOLIN SODIUM 1 G
2000 VIAL (EA) INJECTION ONCE
Refills: 0 | Status: COMPLETED | OUTPATIENT
Start: 2023-08-08 | End: 2023-08-08

## 2023-08-08 RX ORDER — DIPHENHYDRAMINE HCL 50 MG
25 CAPSULE ORAL EVERY 6 HOURS
Refills: 0 | Status: DISCONTINUED | OUTPATIENT
Start: 2023-08-08 | End: 2023-08-10

## 2023-08-08 RX ORDER — ENOXAPARIN SODIUM 100 MG/ML
40 INJECTION SUBCUTANEOUS EVERY 24 HOURS
Refills: 0 | Status: DISCONTINUED | OUTPATIENT
Start: 2023-08-09 | End: 2023-08-10

## 2023-08-08 RX ORDER — KETOROLAC TROMETHAMINE 30 MG/ML
30 SYRINGE (ML) INJECTION EVERY 6 HOURS
Refills: 0 | Status: DISCONTINUED | OUTPATIENT
Start: 2023-08-08 | End: 2023-08-09

## 2023-08-08 RX ORDER — OXYTOCIN 10 UNIT/ML
333.33 VIAL (ML) INJECTION
Qty: 20 | Refills: 0 | Status: DISCONTINUED | OUTPATIENT
Start: 2023-08-08 | End: 2023-08-10

## 2023-08-08 RX ORDER — SODIUM CHLORIDE 9 MG/ML
1000 INJECTION, SOLUTION INTRAVENOUS
Refills: 0 | Status: DISCONTINUED | OUTPATIENT
Start: 2023-08-08 | End: 2023-08-10

## 2023-08-08 RX ORDER — LANOLIN
1 OINTMENT (GRAM) TOPICAL EVERY 6 HOURS
Refills: 0 | Status: DISCONTINUED | OUTPATIENT
Start: 2023-08-08 | End: 2023-08-10

## 2023-08-08 RX ADMIN — Medication 150 MICROGRAM(S): at 07:56

## 2023-08-08 RX ADMIN — Medication 975 MILLIGRAM(S): at 20:57

## 2023-08-08 RX ADMIN — Medication 30 MILLIGRAM(S): at 23:07

## 2023-08-08 RX ADMIN — Medication 1 MILLIUNIT(S)/MIN: at 08:13

## 2023-08-08 RX ADMIN — AZITHROMYCIN 255 MILLIGRAM(S): 500 TABLET, FILM COATED ORAL at 16:55

## 2023-08-08 RX ADMIN — ONDANSETRON 4 MILLIGRAM(S): 8 TABLET, FILM COATED ORAL at 16:30

## 2023-08-08 RX ADMIN — Medication 100 MILLIGRAM(S): at 16:28

## 2023-08-08 RX ADMIN — Medication 975 MILLIGRAM(S): at 21:57

## 2023-08-08 NOTE — BRIEF OPERATIVE NOTE - NSICDXBRIEFPOSTOP_GEN_ALL_CORE_FT
POST-OP DIAGNOSIS:  Labor presentation, breech 08-Aug-2023 18:16:14  Julia Ortiz  Gestational diabetes 08-Aug-2023 18:16:06  Julia Ortiz  Breech presentation 08-Aug-2023 18:16:03  Julia Ortiz

## 2023-08-08 NOTE — OB PROVIDER DELIVERY SUMMARY - NSSELHIDDEN_OBGYN_ALL_OB_FT
[NS_DeliveryRN_OBGYN_ALL_OB_FT:[id number]],[NS_DeliveryAttending1_OBGYN_ALL_OB_FT:[id number]]

## 2023-08-08 NOTE — PROGRESS NOTE ADULT - ASSESSMENT
33y now P5, GBS negative, s/p primary LTCS for breech presentation     -Monitor vitals and bleeding  -Pain management PRN  -Encourage ambulation  -PO hydration, regular diet  -Anticipate dc home tomorrow with instructions to f/u with OBGYN in 1 week to be given    Dr. Bailey and Dr. Celestin

## 2023-08-08 NOTE — PROGRESS NOTE ADULT - SUBJECTIVE AND OBJECTIVE BOX
PGY1 NOTE  Chief Complaint: Postpartum    HPI: Pt doing well, pain well controlled. No events, no acute complaints.   Ambulating: no  Voiding: saleem in place  Diet: regualr  Breastfeeding: no  Denies HA, lightheadedness, palpitations, N/V, fevers, chills, CP, SOB, LE edema, heavy vaginal bleeding.     PAST MEDICAL & SURGICAL HISTORY:  Gestational diabetes      Hypothyroidism      History of laparoscopic adjustable gastric banding      Physical Exam  Vital Signs Last 24 Hrs  T(F): 98.4 (08 Aug 2023 20:37), Max: 98.96 (08 Aug 2023 07:36)  HR: 65 (08 Aug 2023 20:37) (55 - 129)  BP: 108/56 (08 Aug 2023 20:37) (96/52 - 181/62)  RR: 18 (08 Aug 2023 20:37) (18 - 18)    Physical exam:  General - AAOx3, NAD  Heart - S1S2, RRR  Lungs - CTA BL  Abdomen:  - Soft, nontender, nondistended, BS+  - Fundus firm, nontender, below the umbilicus  - incision C/D/I  Pelvis/Vagina - Normal rubra lochia  Extremities - No calf tenderness, no swelling    Labs:                        14.0   9.75  )-----------( 191      ( 07 Aug 2023 20:25 )             40.0     ABO RH Interpretation: O POS (08-07-23 @ 20:25)  Antibody Screen: NEG (08-07-23 @ 20:25)        acetaminophen     Tablet .. 975 milliGRAM(s) Oral <User Schedule>, Routine  diphenhydrAMINE 25 milliGRAM(s) Oral every 6 hours, Routine PRN Pruritus  diphtheria/tetanus/pertussis (acellular) Vaccine (Adacel) 0.5 milliLiter(s) IntraMuscular once, Now  ibuprofen  Tablet. 600 milliGRAM(s) Oral every 6 hours, Routine  ketorolac   Injectable 30 milliGRAM(s) IV Push every 6 hours, Routine  lactated ringers. 1000 milliLiter(s) (125 mL/Hr) IV Continuous <Continuous>, Routine  lanolin Ointment 1 Application(s) Topical every 6 hours, Routine PRN Sore Nipples  magnesium hydroxide Suspension 30 milliLiter(s) Oral two times a day, Routine PRN Constipation  oxyCODONE    IR 5 milliGRAM(s) Oral every 3 hours, Routine PRN Moderate to Severe Pain (4-10)  oxyCODONE    IR 5 milliGRAM(s) Oral once, Routine PRN Moderate to Severe Pain (4-10)  oxytocin Infusion 333.333 milliUNIT(s)/Min (1000 mL/Hr) IV Continuous <Continuous>, Routine  simethicone 80 milliGRAM(s) Chew every 4 hours, Routine PRN Gas         PGY1 NOTE  Chief Complaint: Postpartum    HPI: Pt doing well, pain well controlled. No events, no acute complaints.   Ambulating: no  Voiding: saleem in place  Diet: regualr  Breastfeeding: no  Denies HA, lightheadedness, palpitations, N/V, fevers, chills, CP, SOB, LE edema, heavy vaginal bleeding.     PAST MEDICAL & SURGICAL HISTORY:  Gestational diabetes      Hypothyroidism      History of laparoscopic adjustable gastric banding      Physical Exam  Vital Signs Last 24 Hrs  T(F): 98.4 (08 Aug 2023 20:37), Max: 98.96 (08 Aug 2023 07:36)  HR: 65 (08 Aug 2023 20:37) (55 - 129)  BP: 108/56 (08 Aug 2023 20:37) (96/52 - 181/62)  RR: 18 (08 Aug 2023 20:37) (18 - 18)    Physical exam:  General - AAOx3, NAD  Heart - S1S2, RRR  Lungs - CTA BL  Abdomen:  - Soft, nontender, nondistended, BS+  - Fundus firm, nontender, below the umbilicus  - incision C/D/I  Pelvis/Vagina - Normal rubra lochia  Extremities - No calf tenderness, no swelling    U/O 425cc (1800-00:00)    Labs:                        14.0   9.75  )-----------( 191      ( 07 Aug 2023 20:25 )             40.0     ABO RH Interpretation: O POS (08-07-23 @ 20:25)  Antibody Screen: NEG (08-07-23 @ 20:25)        acetaminophen     Tablet .. 975 milliGRAM(s) Oral <User Schedule>, Routine  diphenhydrAMINE 25 milliGRAM(s) Oral every 6 hours, Routine PRN Pruritus  diphtheria/tetanus/pertussis (acellular) Vaccine (Adacel) 0.5 milliLiter(s) IntraMuscular once, Now  ibuprofen  Tablet. 600 milliGRAM(s) Oral every 6 hours, Routine  ketorolac   Injectable 30 milliGRAM(s) IV Push every 6 hours, Routine  lactated ringers. 1000 milliLiter(s) (125 mL/Hr) IV Continuous <Continuous>, Routine  lanolin Ointment 1 Application(s) Topical every 6 hours, Routine PRN Sore Nipples  magnesium hydroxide Suspension 30 milliLiter(s) Oral two times a day, Routine PRN Constipation  oxyCODONE    IR 5 milliGRAM(s) Oral every 3 hours, Routine PRN Moderate to Severe Pain (4-10)  oxyCODONE    IR 5 milliGRAM(s) Oral once, Routine PRN Moderate to Severe Pain (4-10)  oxytocin Infusion 333.333 milliUNIT(s)/Min (1000 mL/Hr) IV Continuous <Continuous>, Routine  simethicone 80 milliGRAM(s) Chew every 4 hours, Routine PRN Gas         PGY1 NOTE  Chief Complaint: Postpartum    HPI: Pt doing well, pain well controlled. No events, no acute complaints.   Ambulating: no  Voiding: saleem in place  Diet: regualr  Breastfeeding: no  Denies HA, lightheadedness, palpitations, N/V, fevers, chills, CP, SOB, LE edema, heavy vaginal bleeding.     PAST MEDICAL & SURGICAL HISTORY:  Gestational diabetes      Hypothyroidism      History of laparoscopic adjustable gastric banding      Physical Exam  Vital Signs Last 24 Hrs  T(F): 98.4 (08 Aug 2023 20:37), Max: 98.96 (08 Aug 2023 07:36)  HR: 65 (08 Aug 2023 20:37) (55 - 129)  BP: 108/56 (08 Aug 2023 20:37) (96/52 - 181/62)  RR: 18 (08 Aug 2023 20:37) (18 - 18)    Physical exam:  General - AAOx3, NAD  Heart - S1S2, RRR  Lungs - CTA BL  Abdomen:  - Soft, nontender, nondistended, BS+  - Fundus firm, nontender, below the umbilicus  - incision C/D/I  Pelvis/Vagina - Normal rubra lochia  Extremities - No calf tenderness, no swelling    U/O 675cc (1800-22:49)    Labs:                        14.0   9.75  )-----------( 191      ( 07 Aug 2023 20:25 )             40.0     ABO RH Interpretation: O POS (08-07-23 @ 20:25)  Antibody Screen: NEG (08-07-23 @ 20:25)        acetaminophen     Tablet .. 975 milliGRAM(s) Oral <User Schedule>, Routine  diphenhydrAMINE 25 milliGRAM(s) Oral every 6 hours, Routine PRN Pruritus  diphtheria/tetanus/pertussis (acellular) Vaccine (Adacel) 0.5 milliLiter(s) IntraMuscular once, Now  ibuprofen  Tablet. 600 milliGRAM(s) Oral every 6 hours, Routine  ketorolac   Injectable 30 milliGRAM(s) IV Push every 6 hours, Routine  lactated ringers. 1000 milliLiter(s) (125 mL/Hr) IV Continuous <Continuous>, Routine  lanolin Ointment 1 Application(s) Topical every 6 hours, Routine PRN Sore Nipples  magnesium hydroxide Suspension 30 milliLiter(s) Oral two times a day, Routine PRN Constipation  oxyCODONE    IR 5 milliGRAM(s) Oral every 3 hours, Routine PRN Moderate to Severe Pain (4-10)  oxyCODONE    IR 5 milliGRAM(s) Oral once, Routine PRN Moderate to Severe Pain (4-10)  oxytocin Infusion 333.333 milliUNIT(s)/Min (1000 mL/Hr) IV Continuous <Continuous>, Routine  simethicone 80 milliGRAM(s) Chew every 4 hours, Routine PRN Gas

## 2023-08-08 NOTE — PROGRESS NOTE ADULT - SUBJECTIVE AND OBJECTIVE BOX
PGY 2 Note    Patient seen at bedside for evaluation of labor progression.     T(F): 98.96 (07:36)  HR: 83 (10:06)  BP: 130/69 (10:05)  RR: --    EFM: 140/mod/+accels  TOCO: q4m  SVE: 6/70/-1, FSE & IUPC placed by Dr. Lopez    Labs:                        14.0   9.75  )-----------( 191      ( 07 Aug 2023 20:25 )             40.0           ABO RH Interpretation: O POS (23 @ 20:25)    Urinalysis Basic - ( 07 Aug 2023 20:25 )    Color: Yellow / Appearance: Cloudy / S.010 / pH: x  Gluc: x / Ketone: Negative mg/dL  / Bili: Negative / Urobili: 0.2 mg/dL   Blood: x / Protein: Trace mg/dL / Nitrite: Negative   Leuk Esterase: Large / RBC: 0 /HPF /  /HPF   Sq Epi: x / Non Sq Epi: 12 /HPF / Bacteria: Moderate /HPF          Meds: fentanyl (2 MICROgram(s)/mL) + bupivacaine 0.0625%  in 0.9% Sodium Chloride PCEA 250 milliLiter(s) Epidural PCA Continuous  lactated ringers. 1000 milliLiter(s) IV Continuous <Continuous>  levothyroxine 150 MICROGram(s) Oral daily  oxytocin Infusion 333.333 milliUNIT(s)/Min IV Continuous <Continuous>  oxytocin Infusion. 1 milliUNIT(s)/Min IV Continuous <Continuous>

## 2023-08-08 NOTE — PROGRESS NOTE ADULT - SUBJECTIVE AND OBJECTIVE BOX
PGY 1 Note    Patient seen at bedside for evaluation of labor progression. Doing well, pain well controlled on epidural    T(C): 37.2 (23 @ 07:36), Max: 37.2 (23 @ 07:36)  T(F): 98.96 (23 @ 07:36), Max: 98.96 (23 @ 07:36)  HR: 89 (23 @ 14:36) (75 - 120)  BP: 181/62 (23 @ 14:39) (92/47 - 181/62)  RR: 14 (23 @ 21:34) (14 - 14)  SpO2: 98% (23 @ 14:36) (85% - 100%)    EFM: 140/mod/+accels  TOCO: q4-5m, 205 MVU  SVE: deferred, last exam /-2 vertex AROM cl, FSE & IUPC in place    Labs:                        14.0   9.75  )-----------( 191      ( 07 Aug 2023 20:25 )             40.0           ABO RH Interpretation: O POS (23 @ 20:25)  Urinalysis Basic - ( 07 Aug 2023 20:25 )    Color: Yellow / Appearance: Cloudy / S.010 / pH: x  Gluc: x / Ketone: Negative mg/dL  / Bili: Negative / Urobili: 0.2 mg/dL   Blood: x / Protein: Trace mg/dL / Nitrite: Negative   Leuk Esterase: Large / RBC: 0 /HPF /  /HPF   Sq Epi: x / Non Sq Epi: 12 /HPF / Bacteria: Moderate /HPF      Meds: fentanyl (2 MICROgram(s)/mL) + bupivacaine 0.0625%  in 0.9% Sodium Chloride PCEA 250 milliLiter(s) Epidural PCA Continuous  lactated ringers. 1000 milliLiter(s) IV Continuous <Continuous>  levothyroxine 150 MICROGram(s) Oral daily  oxytocin Infusion 333.333 milliUNIT(s)/Min IV Continuous <Continuous>  oxytocin Infusion. 1 milliUNIT(s)/Min IV Continuous <Continuous>    Epidural: placed at 2210  Pitocin: @7mU     PGY 1 Note    Patient seen at bedside for evaluation of labor progression. Doing well, pain well controlled on epidural    T(C): 37.2 (23 @ 07:36), Max: 37.2 (23 @ 07:36)  T(F): 98.96 (23 @ 07:36), Max: 98.96 (23 @ 07:36)  HR: 89 (23 @ 14:36) (75 - 120)  BP: 181/62 (23 @ 14:39) (92/47 - 181/62) --> falsely elevated, patient moving during BP;   RR: 14 (23 @ 21:34) (14 - 14)  SpO2: 98% (23 @ 14:36) (85% - 100%)    EFM: 140/mod/+accels  TOCO: q4-5m, 205 MVU  SVE: deferred, last exam /-2 vertex AROM cl (per JOSE Strickland), FSE & IUPC in place    Labs:                        14.0   9.75  )-----------( 191      ( 07 Aug 2023 20:25 )             40.0       ABO RH Interpretation: O POS (23 @ 20:25)  Urinalysis Basic - ( 07 Aug 2023 20:25 )    Color: Yellow / Appearance: Cloudy / S.010 / pH: x  Gluc: x / Ketone: Negative mg/dL  / Bili: Negative / Urobili: 0.2 mg/dL   Blood: x / Protein: Trace mg/dL / Nitrite: Negative   Leuk Esterase: Large / RBC: 0 /HPF /  /HPF   Sq Epi: x / Non Sq Epi: 12 /HPF / Bacteria: Moderate /HPF    Meds: fentanyl (2 MICROgram(s)/mL) + bupivacaine 0.0625%  in 0.9% Sodium Chloride PCEA 250 milliLiter(s) Epidural PCA Continuous  lactated ringers. 1000 milliLiter(s) IV Continuous <Continuous>  levothyroxine 150 MICROGram(s) Oral daily  oxytocin Infusion 333.333 milliUNIT(s)/Min IV Continuous <Continuous>  oxytocin Infusion. 1 milliUNIT(s)/Min IV Continuous <Continuous>    Epidural: placed at 2210  Pitocin: currently @7mU

## 2023-08-08 NOTE — OB RN INTRAOPERATIVE NOTE - NSSELHIDDEN_OBGYN_ALL_OB_FT
[NS_DeliveryRN_OBGYN_ALL_OB_FT:[id number]],[NS_DeliveryAttending1_OBGYN_ALL_OB_FT:[id number]=]

## 2023-08-08 NOTE — OB PROVIDER IHI INDUCTION/AUGMENTATION NOTE - NS_CHECKALL_OBGYN_ALL_OB
Order was written/H&P was completed/Contractions pattern was reviewed/FHR was reviewed/Induction / Augmentation was discussed
Satisfactory

## 2023-08-08 NOTE — BRIEF OPERATIVE NOTE - NSICDXBRIEFPREOP_GEN_ALL_CORE_FT
PRE-OP DIAGNOSIS:  Breech presentation 08-Aug-2023 18:15:01  Julia Ortiz  Gestational diabetes 08-Aug-2023 18:15:23  Julia Ortiz  Labor presentation, breech 08-Aug-2023 18:15:56  Juila Ortiz

## 2023-08-08 NOTE — PROGRESS NOTE ADULT - ASSESSMENT
34 yo  at 37w0d, GBS negative, IOL for worsening GDM, recommended by MFM, progressing well, on pitocin  - IVF hydration  - Pain management w/ epidural  - Monitor vitals  - Clear liquid diet  - cont IOL with pitocin  - ISE and IUPC in place, cont monitoring    Dr Chapa and Dr. Celestin to be made aware 32 yo  at 37w0d, GBS negative, on pitocin for IOL for worsening GDMA2, progressing well.    - IVF hydration  - Pain management w/ epidural  - Monitor vitals  - Clear liquid diet  - cont IOL with pitocin 1x1  - ISE and IUPC in place, cont monitoring    Dr Hebert and Dr. Celestin aware

## 2023-08-08 NOTE — PROGRESS NOTE ADULT - ASSESSMENT
34 yo  at 37w0d, GBS negative, IOL for worsening GDM, recommended by MFM, doing well.     - Cont EFM/[place]  - IVF hydration  - Pain management w/ epidural  - Monitor vitals  - Clear liquid diet  - C/w CRB 80/80    Dr Lopez aware

## 2023-08-08 NOTE — PROGRESS NOTE ADULT - SUBJECTIVE AND OBJECTIVE BOX
Patient assessed for increased pain. epidural in situ. Patient last checked by AM covering attending and then PA during the day.     VE: 6-7/60/-2, palpable buttocks of fetus  Sono: confirmed breech presentation, appears partial breech, foot palpated by buttocks  EFM: cat 1  Ord: ctx q2-3min    PLan  - ancef/azithro  - NPO/IVF  - on call to OR  - anesthesia aware  - pitocin held

## 2023-08-08 NOTE — PROGRESS NOTE ADULT - ASSESSMENT
34 yo  at 37w0d, GBS negative, IOL for worsening GDM, recommended by MFM, doing well, on pitocin    - Cont EFM/[place]  - IVF hydration  - Pain management w/ epidural  - Monitor vitals  - Clear liquid diet  - cont IOL with pitocin  - FSE/IUPC in place    Dr Lopez at bedside

## 2023-08-08 NOTE — OB RN DELIVERY SUMMARY - NSSELHIDDEN_OBGYN_ALL_OB_FT
[NS_DeliveryRN_OBGYN_ALL_OB_FT:[id number]] [NS_DeliveryRN_OBGYN_ALL_OB_FT:[id number]],[NS_DeliveryAttending1_OBGYN_ALL_OB_FT:[id number]=]

## 2023-08-08 NOTE — PROGRESS NOTE ADULT - SUBJECTIVE AND OBJECTIVE BOX
PGY3 Note    S: Patient seen and examined at bedside. Doing well, without complaints.     Vital Signs Last 24 Hrs  T(C): 36.7 (08 Aug 2023 01:44), Max: 36.8 (07 Aug 2023 20:10)  T(F): 98.06 (08 Aug 2023 01:44), Max: 98.2 (07 Aug 2023 20:10)  HR: 98 (08 Aug 2023 05:05) (75 - 114)  BP: 136/80 (08 Aug 2023 05:05) (92/47 - 142/72)  RR: 14 (07 Aug 2023 21:34) (14 - 14)  SpO2: 85% (07 Aug 2023 22:18) (85% - 100%)    EFM: 140/mod nury/pos accel  TOCO: none  SVE:  deferred    Labs:                        14.0   9.75  )-----------( 191      ( 07 Aug 2023 20:25 )             40.0         ABO RH Interpretation: O POS (23 @ 20:25)    Urinalysis Basic - ( 07 Aug 2023 20:25 )  Color: Yellow / Appearance: Cloudy / S.010 / pH: x  Gluc: x / Ketone: Negative mg/dL  / Bili: Negative / Urobili: 0.2 mg/dL   Blood: x / Protein: Trace mg/dL / Nitrite: Negative   Leuk Esterase: Large / RBC: 0 /HPF /  /HPF   Sq Epi: x / Non Sq Epi: 12 /HPF / Bacteria: Moderate /HPF    Prenatal Syphilis Test: Nonreact (23 @ 20:25)

## 2023-08-08 NOTE — BRIEF OPERATIVE NOTE - OPERATION/FINDINGS
Pfannenstiel skin incision, low transverse uterine incision. Female  in breech presentation, delivered, APGARs 9/9, 2700g. Hysterotomy closed in a single layer. Normal tubes and ovaries bilaterally.

## 2023-08-09 ENCOUNTER — TRANSCRIPTION ENCOUNTER (OUTPATIENT)
Age: 33
End: 2023-08-09

## 2023-08-09 LAB
BASOPHILS # BLD AUTO: 0.02 K/UL — SIGNIFICANT CHANGE UP (ref 0–0.2)
BASOPHILS NFR BLD AUTO: 0.2 % — SIGNIFICANT CHANGE UP (ref 0–1)
EOSINOPHIL # BLD AUTO: 0.05 K/UL — SIGNIFICANT CHANGE UP (ref 0–0.7)
EOSINOPHIL NFR BLD AUTO: 0.4 % — SIGNIFICANT CHANGE UP (ref 0–8)
HCT VFR BLD CALC: 37.3 % — SIGNIFICANT CHANGE UP (ref 37–47)
HGB BLD-MCNC: 12.4 G/DL — SIGNIFICANT CHANGE UP (ref 12–16)
IMM GRANULOCYTES NFR BLD AUTO: 0.6 % — HIGH (ref 0.1–0.3)
LYMPHOCYTES # BLD AUTO: 18.8 % — LOW (ref 20.5–51.1)
LYMPHOCYTES # BLD AUTO: 2.5 K/UL — SIGNIFICANT CHANGE UP (ref 1.2–3.4)
MCHC RBC-ENTMCNC: 29.1 PG — SIGNIFICANT CHANGE UP (ref 27–31)
MCHC RBC-ENTMCNC: 33.2 G/DL — SIGNIFICANT CHANGE UP (ref 32–37)
MCV RBC AUTO: 87.6 FL — SIGNIFICANT CHANGE UP (ref 81–99)
MONOCYTES # BLD AUTO: 0.97 K/UL — HIGH (ref 0.1–0.6)
MONOCYTES NFR BLD AUTO: 7.3 % — SIGNIFICANT CHANGE UP (ref 1.7–9.3)
NEUTROPHILS # BLD AUTO: 9.67 K/UL — HIGH (ref 1.4–6.5)
NEUTROPHILS NFR BLD AUTO: 72.7 % — SIGNIFICANT CHANGE UP (ref 42.2–75.2)
NRBC # BLD: 0 /100 WBCS — SIGNIFICANT CHANGE UP (ref 0–0)
PLATELET # BLD AUTO: 209 K/UL — SIGNIFICANT CHANGE UP (ref 130–400)
PMV BLD: 10.7 FL — HIGH (ref 7.4–10.4)
RBC # BLD: 4.26 M/UL — SIGNIFICANT CHANGE UP (ref 4.2–5.4)
RBC # FLD: 13.4 % — SIGNIFICANT CHANGE UP (ref 11.5–14.5)
WBC # BLD: 13.29 K/UL — HIGH (ref 4.8–10.8)
WBC # FLD AUTO: 13.29 K/UL — HIGH (ref 4.8–10.8)

## 2023-08-09 PROCEDURE — 99231 SBSQ HOSP IP/OBS SF/LOW 25: CPT

## 2023-08-09 RX ORDER — MAGNESIUM HYDROXIDE 400 MG/1
30 TABLET, CHEWABLE ORAL
Qty: 0 | Refills: 0 | DISCHARGE
Start: 2023-08-09

## 2023-08-09 RX ORDER — OXYCODONE HYDROCHLORIDE 5 MG/1
1 TABLET ORAL
Qty: 8 | Refills: 0
Start: 2023-08-09 | End: 2023-08-10

## 2023-08-09 RX ORDER — LEVOTHYROXINE SODIUM 125 MCG
150 TABLET ORAL DAILY
Refills: 0 | Status: DISCONTINUED | OUTPATIENT
Start: 2023-08-10 | End: 2023-08-10

## 2023-08-09 RX ORDER — IBUPROFEN 200 MG
1 TABLET ORAL
Qty: 0 | Refills: 0 | DISCHARGE
Start: 2023-08-09

## 2023-08-09 RX ORDER — LEVOTHYROXINE SODIUM 125 MCG
150 TABLET ORAL ONCE
Refills: 0 | Status: COMPLETED | OUTPATIENT
Start: 2023-08-09 | End: 2023-08-09

## 2023-08-09 RX ORDER — SIMETHICONE 80 MG/1
1 TABLET, CHEWABLE ORAL
Qty: 20 | Refills: 0
Start: 2023-08-09 | End: 2023-08-13

## 2023-08-09 RX ORDER — LEVOTHYROXINE SODIUM 125 MCG
150 TABLET ORAL DAILY
Refills: 0 | Status: DISCONTINUED | OUTPATIENT
Start: 2023-08-09 | End: 2023-08-09

## 2023-08-09 RX ORDER — LEVOTHYROXINE SODIUM 125 MCG
1 TABLET ORAL
Qty: 0 | Refills: 0 | DISCHARGE
Start: 2023-08-09

## 2023-08-09 RX ORDER — IBUPROFEN 200 MG
600 TABLET ORAL EVERY 6 HOURS
Refills: 0 | Status: DISCONTINUED | OUTPATIENT
Start: 2023-08-09 | End: 2023-08-10

## 2023-08-09 RX ORDER — ACETAMINOPHEN 500 MG
3 TABLET ORAL
Qty: 0 | Refills: 0 | DISCHARGE
Start: 2023-08-09

## 2023-08-09 RX ADMIN — Medication 150 MICROGRAM(S): at 10:06

## 2023-08-09 RX ADMIN — Medication 975 MILLIGRAM(S): at 09:38

## 2023-08-09 RX ADMIN — SIMETHICONE 80 MILLIGRAM(S): 80 TABLET, CHEWABLE ORAL at 12:37

## 2023-08-09 RX ADMIN — Medication 30 MILLIGRAM(S): at 00:01

## 2023-08-09 RX ADMIN — Medication 30 MILLIGRAM(S): at 12:36

## 2023-08-09 RX ADMIN — Medication 30 MILLIGRAM(S): at 18:19

## 2023-08-09 RX ADMIN — Medication 600 MILLIGRAM(S): at 23:10

## 2023-08-09 RX ADMIN — Medication 30 MILLIGRAM(S): at 18:04

## 2023-08-09 RX ADMIN — SIMETHICONE 80 MILLIGRAM(S): 80 TABLET, CHEWABLE ORAL at 18:08

## 2023-08-09 RX ADMIN — Medication 975 MILLIGRAM(S): at 16:16

## 2023-08-09 RX ADMIN — Medication 975 MILLIGRAM(S): at 15:42

## 2023-08-09 RX ADMIN — Medication 975 MILLIGRAM(S): at 09:08

## 2023-08-09 RX ADMIN — Medication 30 MILLIGRAM(S): at 06:05

## 2023-08-09 RX ADMIN — Medication 30 MILLIGRAM(S): at 13:06

## 2023-08-09 NOTE — DISCHARGE NOTE OB - PLAN OF CARE
Nothing in the vagina for 6 weeks (no sex, no tampons, no douching). Avoid tub baths, you may shower.  If you have a fever of 100.4F or greater, severe vaginal bleeding, or severe abdominal pain, call your Ob/Gyn or come to the emergency department immediately.  Please follow up with your provider in 1 week for incision check and dressing change, and 6 weeks for postpartum visit.

## 2023-08-09 NOTE — PROGRESS NOTE ADULT - ASSESSMENT
A/P: 33y yo P4 s/p primary LTCS for breech in labor, POD#1, GDMA2, hypothyroidism, recovering well   -ambulation encouraged  -PO hydration encouraged  -regular diet  -lovenox ordered for DVT prophylaxis  -Incentive Spirometry encouraged  -pain management per routine  -UO adequate, saleem removed, TOV 1300  -f/u AM CBC   -synthroid 150mcg daily    Dr. Hebert and Dr. Celestin to be aware

## 2023-08-09 NOTE — DISCHARGE NOTE OB - ADDITIONAL INSTRUCTIONS
Please follow up with your provider in 1 week for incision check and dressing change, and 6 weeks for postpartum visit.

## 2023-08-09 NOTE — PROGRESS NOTE ADULT - SUBJECTIVE AND OBJECTIVE BOX
Patient seen resting comfortably. No acute events overnight, no current complaints. Pain controlled with motrin. She reports lochia as minimal, no fevers, chills, nausea, vomiting, SOB, palpitations, dizziness. Patient is ambulating, tolerating diet, voiding freely without issue. Breastfeeding without difficulty. Reports passing flatus, no bowel movement.    UOP 1875cc overnight, saleem to be removed    Exam:  Gen: AAOx3  Breast: no engorgement, erythema, or tenderness  Abd: soft, non-tender, non-distended, uterus firm and to umbilicus, dressing c/d/i  Ext: non-tender LE bilaterally    A: POD#1 s/p PCS for breech presentation in labor, stable    Plan  - continue routine pp care  - encourage ambulating and breastfeeding  - ERAS for pain control  - continue uday  - lovenox daily  - f/u am cbc, fe if acute blood loss anemia    GDM  - discontinue pregnancy mgmt, f/u OGTT pp

## 2023-08-09 NOTE — PROGRESS NOTE ADULT - SUBJECTIVE AND OBJECTIVE BOX
SURAJGILMAR RAOTAYLER  33y  Female    PGY2 Note:  Patient seen and examined bedside. No overnight events. Denies heavy vaginal bleeding. Pain well controlled. Ambulating without difficulty. Tolerating diet, voiding, passing flatus, no BM. Breastfeeding and bottle feeding.    Physical Exam  Vital Signs Last 24 Hrs  T(C): 36.6 (09 Aug 2023 03:11), Max: 37.2 (08 Aug 2023 07:36)  T(F): 97.8 (09 Aug 2023 03:11), Max: 98.96 (08 Aug 2023 07:36)  HR: 67 (09 Aug 2023 03:11) (55 - 129)  BP: 103/58 (09 Aug 2023 03:11) (96/52 - 181/62)  BP(mean): 76 (08 Aug 2023 20:37) (76 - 76)  RR: 18 (09 Aug 2023 03:11) (18 - 18)  SpO2: 88% (08 Aug 2023 20:02) (80% - 100%)    Parameters below as of 08 Aug 2023 20:37  Patient On (Oxygen Delivery Method): room air    Gen: NAD, sitting comfortably  CV: RRR. No murmurs gallops or rubs.  Pulm: CTAB. No wheezes or rales.  Ext: No calf tenderness, no swelling.   Abd: Nondistended, soft, nontender, BS+, fundus firm, and below umbilicus.   Lochia: Minimal rubra  Wound: RUBEN in place over Pfannenstiel skin incision clean, dry intact No surrounding edema or erythema.    PAST MEDICAL & SURGICAL HISTORY:  Gestational diabetes  Hypothyroidism  History of laparoscopic adjustable gastric banding    Diet: Regular    Labs:                        14.0   9.75  )-----------( 191      ( 07 Aug 2023 20:25 )             40.0          acetaminophen     Tablet .. 975 milliGRAM(s) Oral <User Schedule>  diphenhydrAMINE 25 milliGRAM(s) Oral every 6 hours PRN  diphtheria/tetanus/pertussis (acellular) Vaccine (Adacel) 0.5 milliLiter(s) IntraMuscular once  ibuprofen  Tablet. 600 milliGRAM(s) Oral every 6 hours  ketorolac   Injectable 30 milliGRAM(s) IV Push every 6 hours  lactated ringers. 1000 milliLiter(s) IV Continuous <Continuous>  lanolin Ointment 1 Application(s) Topical every 6 hours PRN  levothyroxine 150 MICROGram(s) Oral once  magnesium hydroxide Suspension 30 milliLiter(s) Oral two times a day PRN  oxyCODONE    IR 5 milliGRAM(s) Oral every 3 hours PRN  oxyCODONE    IR 5 milliGRAM(s) Oral once PRN  oxytocin Infusion 333.333 milliUNIT(s)/Min IV Continuous <Continuous>  simethicone 80 milliGRAM(s) Chew every 4 hours PRN

## 2023-08-09 NOTE — DISCHARGE NOTE OB - PATIENT PORTAL LINK FT
You can access the FollowMyHealth Patient Portal offered by [facility] by registering at the following website: [email or web address]. By joining [employer]’s FollowMyHealth portal, you will also be able to view your health information using other applications (apps) compatible with our system.

## 2023-08-09 NOTE — DISCHARGE NOTE OB - CARE PROVIDER_API CALL
Gabe Celestin  Obstetrics and Gynecology  [address] [place] [address]  Phone: [phone]  Fax: [phone]  Follow Up Time:

## 2023-08-09 NOTE — DISCHARGE NOTE OB - CARE PLAN
Principal Discharge DX:	 delivery delivered  Assessment and plan of treatment:	Nothing in the vagina for 6 weeks (no sex, no tampons, no douching). Avoid tub baths, you may shower.  If you have a fever of 100.4F or greater, severe vaginal bleeding, or severe abdominal pain, call your Ob/Gyn or come to the emergency department immediately.  Please follow up with your provider in 1 week for incision check and dressing change, and 6 weeks for postpartum visit.   1

## 2023-08-09 NOTE — DISCHARGE NOTE OB - MEDICATION SUMMARY - MEDICATIONS TO TAKE
I will START or STAY ON the medications listed below when I get home from the hospital:    ibuprofen 600 mg oral tablet  -- 1 tab(s) by mouth every 6 hours  -- Indication: For pain    acetaminophen 325 mg oral tablet  -- 3 tab(s) by mouth every 6 hours  -- Indication: For pain    oxyCODONE 5 mg oral tablet  -- 1 tab(s) by mouth every 6 hours MDD: 4  -- Indication: For severe pain    magnesium hydroxide 8% oral suspension  -- 30 milliliter(s) by mouth 2 times a day As needed Constipation  -- Indication: For constipation    simethicone 125 mg oral tablet, chewable  -- 1 tab(s) chewed every 6 hours  -- Indication: For Gas    Synthroid 150 mcg (0.15 mg) oral tablet  -- 1 tab(s) by mouth once a day  -- Indication: For Hypothyroidism

## 2023-08-10 VITALS
RESPIRATION RATE: 18 BRPM | DIASTOLIC BLOOD PRESSURE: 70 MMHG | HEART RATE: 86 BPM | TEMPERATURE: 98 F | SYSTOLIC BLOOD PRESSURE: 116 MMHG

## 2023-08-10 PROCEDURE — 99231 SBSQ HOSP IP/OBS SF/LOW 25: CPT

## 2023-08-10 RX ORDER — IBUPROFEN 200 MG
1 TABLET ORAL
Qty: 56 | Refills: 0
Start: 2023-08-10 | End: 2023-08-23

## 2023-08-10 RX ORDER — ACETAMINOPHEN 500 MG
3 TABLET ORAL
Qty: 168 | Refills: 0
Start: 2023-08-10 | End: 2023-08-23

## 2023-08-10 RX ADMIN — Medication 975 MILLIGRAM(S): at 08:12

## 2023-08-10 RX ADMIN — Medication 600 MILLIGRAM(S): at 06:35

## 2023-08-10 RX ADMIN — Medication 150 MICROGRAM(S): at 06:35

## 2023-08-10 RX ADMIN — Medication 975 MILLIGRAM(S): at 09:03

## 2023-08-10 RX ADMIN — Medication 600 MILLIGRAM(S): at 12:35

## 2023-08-10 NOTE — PROGRESS NOTE ADULT - SUBJECTIVE AND OBJECTIVE BOX
SURAJ JALENTAYLER  33y  Female    PGY1 Note:    Patient seen and examined bedside. No overnight events. Denies heavy vaginal bleeding. Pain well controlled. Denies headaches, vision changes, dizziness, lightheadedness, CP, SOB, palpitations, RUQ pain. Denies fever, chills, nausea/vomiting. Ambulating. Tolerating diet, voiding, passing flatus, no BM. Breastfeeding. Desires to go home today.     Physical Exam  Vital Signs Last 24 Hrs  T(C): 36.7 (09 Aug 2023 23:37), Max: 37 (09 Aug 2023 07:50)  T(F): 98 (09 Aug 2023 23:37), Max: 98.6 (09 Aug 2023 07:50)  HR: 91 (09 Aug 2023 23:37) (62 - 91)  BP: 123/74 (09 Aug 2023 23:37) (89/54 - 125/75)  RR: 18 (09 Aug 2023 23:37) (18 - 18)    Gen: NAD, sitting comfortably  CV: RRR. No murmurs gallops or rubs.  Pulm: CTAB. No wheezes or rales.  Ext: No calf tenderness, no swelling.   Abd: Nondistended, soft, nontender, BS+, fundus firm, and below umbilicus.   Lochia: Minimal rubra  Wound: Dressing with uday in place. No surrounding edema or erythema.    PAST MEDICAL & SURGICAL HISTORY:  Gestational diabetes  Hypothyroidism  History of laparoscopic adjustable gastric banding    Diet: regular    Labs:                        12.4   13.29 )-----------( 209      ( 09 Aug 2023 06:33 )             37.3                         14.0   9.75  )-----------( 191      ( 07 Aug 2023 20:25 )             40.0          acetaminophen     Tablet .. 975 milliGRAM(s) Oral <User Schedule>  diphenhydrAMINE 25 milliGRAM(s) Oral every 6 hours PRN  diphtheria/tetanus/pertussis (acellular) Vaccine (Adacel) 0.5 milliLiter(s) IntraMuscular once  enoxaparin Injectable 40 milliGRAM(s) SubCutaneous every 24 hours  ibuprofen  Tablet. 600 milliGRAM(s) Oral every 6 hours  lactated ringers. 1000 milliLiter(s) IV Continuous <Continuous>  lanolin Ointment 1 Application(s) Topical every 6 hours PRN  levothyroxine 150 MICROGram(s) Oral daily  magnesium hydroxide Suspension 30 milliLiter(s) Oral two times a day PRN  oxyCODONE    IR 5 milliGRAM(s) Oral every 3 hours PRN  oxyCODONE    IR 5 milliGRAM(s) Oral once PRN  oxytocin Infusion 333.333 milliUNIT(s)/Min IV Continuous <Continuous>  simethicone 80 milliGRAM(s) Chew every 4 hours PRN

## 2023-08-10 NOTE — PROGRESS NOTE ADULT - ATTENDING COMMENTS
patient seen and evaluated, feels well, recovering normally  wants d/c home today  precautions  for f/u one week  precautions

## 2023-08-10 NOTE — PROGRESS NOTE ADULT - ASSESSMENT
A/P: 33y now P4, s/p primary LTCS for breech presentation in labor, POD #2, recovering well.     -ambulation encouraged  -PO hydration encouraged  -regular diet  -lovenox for DVT prophylaxis  -Incentive Spirometry encouraged  -pain management per routine    Dr. Hebert and OBGYN attending to be made aware.

## 2023-08-14 DIAGNOSIS — Z3A.36 36 WEEKS GESTATION OF PREGNANCY: ICD-10-CM

## 2023-08-14 DIAGNOSIS — Z79.890 HORMONE REPLACEMENT THERAPY: ICD-10-CM

## 2023-08-14 DIAGNOSIS — E03.9 HYPOTHYROIDISM, UNSPECIFIED: ICD-10-CM

## 2023-08-14 DIAGNOSIS — Z28.09 IMMUNIZATION NOT CARRIED OUT BECAUSE OF OTHER CONTRAINDICATION: ICD-10-CM

## 2023-08-14 PROBLEM — O24.419 GESTATIONAL DIABETES MELLITUS IN PREGNANCY, UNSPECIFIED CONTROL: Chronic | Status: ACTIVE | Noted: 2023-08-07

## 2023-08-23 ENCOUNTER — APPOINTMENT (OUTPATIENT)
Dept: OBGYN | Facility: CLINIC | Age: 33
End: 2023-08-23
Payer: COMMERCIAL

## 2023-08-23 VITALS
HEIGHT: 62 IN | DIASTOLIC BLOOD PRESSURE: 81 MMHG | WEIGHT: 204 LBS | BODY MASS INDEX: 37.54 KG/M2 | SYSTOLIC BLOOD PRESSURE: 119 MMHG

## 2023-08-23 VITALS — SYSTOLIC BLOOD PRESSURE: 119 MMHG | DIASTOLIC BLOOD PRESSURE: 81 MMHG | BODY MASS INDEX: 37.31 KG/M2 | WEIGHT: 204 LBS

## 2023-08-23 DIAGNOSIS — Z98.890 OTHER SPECIFIED POSTPROCEDURAL STATES: ICD-10-CM

## 2023-08-23 DIAGNOSIS — Z51.89 ENCOUNTER FOR OTHER SPECIFIED AFTERCARE: ICD-10-CM

## 2023-08-23 DIAGNOSIS — Z86.39 PERSONAL HISTORY OF OTHER ENDOCRINE, NUTRITIONAL AND METABOLIC DISEASE: ICD-10-CM

## 2023-08-23 DIAGNOSIS — Z86.32 PERSONAL HISTORY OF GESTATIONAL DIABETES: ICD-10-CM

## 2023-08-23 PROCEDURE — 99212 OFFICE O/P EST SF 10 MIN: CPT

## 2023-08-25 PROBLEM — Z86.39 HISTORY OF HYPOTHYROIDISM: Status: RESOLVED | Noted: 2023-08-25 | Resolved: 2023-08-25

## 2023-08-25 PROBLEM — Z86.32 HISTORY OF GESTATIONAL DIABETES MELLITUS: Status: RESOLVED | Noted: 2023-08-25 | Resolved: 2023-08-25

## 2023-08-25 NOTE — HISTORY OF PRESENT ILLNESS
[FreeTextEntry1] : Pt is here for pos-op f/u and wound csre following primary c/s LT 8/8/23, baby weighed 5-15,IOL at 37.3 wks,  pregnancy complicated by GDMA2 , requiring continuous increases in Insulin. Baby was vertex in early labor, turned to breech and then had c/s.  Doing well post op.  checked a few BS and were normal range. [Patient reported PAP Smear was normal] : Patient reported PAP Smear was normal [PapSmeardate] : 2023

## 2023-08-25 NOTE — REVIEW OF SYSTEMS
[Negative] : Gastrointestinal [Abdominal Pain] : no abdominal pain [Constipation] : no constipation [Vomiting] : no vomiting [Bloating] : no bloating [Nausea] : no nausea

## 2023-08-25 NOTE — PHYSICAL EXAM
[Chaperone Present] : A chaperone was present in the examining room during all aspects of the physical examination [Soft] : soft [Non-tender] : non-tender [Non-distended] : non-distended [FreeTextEntry7] : Incision cdi

## 2024-07-29 ENCOUNTER — RX ONLY (RX ONLY)
Age: 34
End: 2024-07-29

## 2024-07-29 ENCOUNTER — OFFICE (OUTPATIENT)
Dept: URBAN - METROPOLITAN AREA CLINIC 76 | Facility: CLINIC | Age: 34
Setting detail: OPHTHALMOLOGY
End: 2024-07-29
Payer: COMMERCIAL

## 2024-07-29 DIAGNOSIS — H40.053: ICD-10-CM

## 2024-07-29 PROBLEM — B30.9 VIRAL CONJUNCTIVITIS: Status: ACTIVE | Noted: 2024-07-29

## 2024-07-29 PROCEDURE — 92002 INTRM OPH EXAM NEW PATIENT: CPT | Performed by: OPTOMETRIST

## 2025-05-22 NOTE — DISCHARGE NOTE OB - PRO FEEDING PLAN INFANT OB
Bed in lowest position, wheels locked, appropriate side rails in place/Call bell, personal items and telephone in reach/Instruct patient to call for assistance before getting out of bed or chair/Non-slip footwear when patient is out of bed/[place] to call system/Physically safe environment - no spills, clutter or unnecessary equipment/Purposeful Proactive Rounding/Room/bathroom lighting operational, light cord in reach
initiation of breastfeeding/breast milk feeding